# Patient Record
Sex: FEMALE | Race: WHITE | Employment: UNEMPLOYED | URBAN - METROPOLITAN AREA
[De-identification: names, ages, dates, MRNs, and addresses within clinical notes are randomized per-mention and may not be internally consistent; named-entity substitution may affect disease eponyms.]

---

## 2017-01-30 ENCOUNTER — OFFICE VISIT (OUTPATIENT)
Dept: CARDIOLOGY CLINIC | Age: 47
End: 2017-01-30

## 2017-01-30 VITALS
HEIGHT: 59 IN | WEIGHT: 227.6 LBS | BODY MASS INDEX: 45.88 KG/M2 | HEART RATE: 96 BPM | OXYGEN SATURATION: 98 % | SYSTOLIC BLOOD PRESSURE: 130 MMHG | DIASTOLIC BLOOD PRESSURE: 80 MMHG

## 2017-01-30 DIAGNOSIS — R07.2 PRECORDIAL PAIN: Primary | ICD-10-CM

## 2017-01-30 PROCEDURE — G8484 FLU IMMUNIZE NO ADMIN: HCPCS | Performed by: INTERNAL MEDICINE

## 2017-01-30 PROCEDURE — G8427 DOCREV CUR MEDS BY ELIG CLIN: HCPCS | Performed by: INTERNAL MEDICINE

## 2017-01-30 PROCEDURE — 1036F TOBACCO NON-USER: CPT | Performed by: INTERNAL MEDICINE

## 2017-01-30 PROCEDURE — 93000 ELECTROCARDIOGRAM COMPLETE: CPT | Performed by: INTERNAL MEDICINE

## 2017-01-30 PROCEDURE — G8419 CALC BMI OUT NRM PARAM NOF/U: HCPCS | Performed by: INTERNAL MEDICINE

## 2017-01-30 PROCEDURE — 99204 OFFICE O/P NEW MOD 45 MIN: CPT | Performed by: INTERNAL MEDICINE

## 2017-01-30 RX ORDER — OMEPRAZOLE 40 MG/1
40 CAPSULE, DELAYED RELEASE ORAL DAILY
Qty: 90 CAPSULE | Refills: 6 | Status: SHIPPED | OUTPATIENT
Start: 2017-01-30 | End: 2018-05-02

## 2018-05-02 ENCOUNTER — PAT TELEPHONE (OUTPATIENT)
Dept: PREADMISSION TESTING | Age: 48
End: 2018-05-02

## 2018-05-02 VITALS — HEIGHT: 59 IN | WEIGHT: 230 LBS | BODY MASS INDEX: 46.37 KG/M2

## 2018-05-02 RX ORDER — LOSARTAN POTASSIUM 25 MG/1
25 TABLET ORAL DAILY
COMMUNITY

## 2018-05-07 ENCOUNTER — HOSPITAL ENCOUNTER (OUTPATIENT)
Dept: ENDOSCOPY | Age: 48
Discharge: HOME | End: 2018-05-07
Attending: INTERNAL MEDICINE | Admitting: INTERNAL MEDICINE

## 2018-05-07 VITALS
HEART RATE: 69 BPM | SYSTOLIC BLOOD PRESSURE: 109 MMHG | TEMPERATURE: 97 F | RESPIRATION RATE: 16 BRPM | DIASTOLIC BLOOD PRESSURE: 69 MMHG | OXYGEN SATURATION: 98 % | HEIGHT: 59 IN | BODY MASS INDEX: 46.13 KG/M2 | WEIGHT: 228.84 LBS

## 2018-05-07 LAB — PREGNANCY, URINE: NEGATIVE

## 2018-05-07 RX ORDER — SODIUM CHLORIDE 0.9 % (FLUSH) 0.9 %
10 SYRINGE (ML) INJECTION PRN
Status: DISCONTINUED | OUTPATIENT
Start: 2018-05-07 | End: 2018-05-08 | Stop reason: HOSPADM

## 2018-05-07 RX ORDER — SODIUM CHLORIDE 9 MG/ML
INJECTION, SOLUTION INTRAVENOUS CONTINUOUS
Status: DISCONTINUED | OUTPATIENT
Start: 2018-05-07 | End: 2018-05-08 | Stop reason: HOSPADM

## 2018-05-07 RX ORDER — SODIUM CHLORIDE 0.9 % (FLUSH) 0.9 %
10 SYRINGE (ML) INJECTION EVERY 12 HOURS SCHEDULED
Status: DISCONTINUED | OUTPATIENT
Start: 2018-05-07 | End: 2018-05-08 | Stop reason: HOSPADM

## 2018-05-07 RX ORDER — ONDANSETRON 2 MG/ML
4 INJECTION INTRAMUSCULAR; INTRAVENOUS
Status: ACTIVE | OUTPATIENT
Start: 2018-05-07 | End: 2018-05-07

## 2018-05-07 ASSESSMENT — PAIN SCALES - GENERAL
PAINLEVEL_OUTOF10: 0

## 2018-05-07 ASSESSMENT — LIFESTYLE VARIABLES: SMOKING_STATUS: 0

## 2018-05-07 ASSESSMENT — PAIN - FUNCTIONAL ASSESSMENT: PAIN_FUNCTIONAL_ASSESSMENT: 0-10

## 2018-05-07 ASSESSMENT — ENCOUNTER SYMPTOMS: SHORTNESS OF BREATH: 0

## 2023-06-20 ENCOUNTER — HOSPITAL ENCOUNTER (OUTPATIENT)
Dept: MRI IMAGING | Age: 53
Discharge: HOME | End: 2023-06-20
Attending: NEUROLOGICAL SURGERY
Payer: COMMERCIAL

## 2023-06-20 DIAGNOSIS — D32.9 MENINGIOMA (HCC): ICD-10-CM

## 2023-06-20 LAB
PERFORMED ON: NORMAL
POC CREATININE: 0.6 MG/DL (ref 0.6–1.1)
POC SAMPLE TYPE: NORMAL

## 2023-06-20 PROCEDURE — A9577 INJ MULTIHANCE: HCPCS | Performed by: NEUROLOGICAL SURGERY

## 2023-06-20 PROCEDURE — 70553 MRI BRAIN STEM W/O & W/DYE: CPT

## 2023-06-20 PROCEDURE — 82565 ASSAY OF CREATININE: CPT

## 2023-06-20 PROCEDURE — 6360000004 HC RX CONTRAST MEDICATION: Performed by: NEUROLOGICAL SURGERY

## 2023-06-20 RX ADMIN — GADOBENATE DIMEGLUMINE 20 ML: 529 INJECTION, SOLUTION INTRAVENOUS at 10:45

## 2023-08-17 ENCOUNTER — CLINICAL DOCUMENTATION (OUTPATIENT)
Age: 53
End: 2023-08-17

## 2023-08-17 ENCOUNTER — CLINICAL DOCUMENTATION (OUTPATIENT)
Dept: SPIRITUAL SERVICES | Age: 53
End: 2023-08-17

## 2023-08-17 NOTE — ACP (ADVANCE CARE PLANNING)
Advance Care Planning   Advance Care Planning Note  Ambulatory Spiritual Care Services    Date:  8/17/2023    Received request from Other: Patient's friend Raynald Felty . Consultation conversation participants:   Patient who understands ACP conversation  Patient's friend(s)     Goals of ACP Conversation:  Discuss advance care planning documents  Facilitate a discussion related to patient's goals of care as they align with the patient's values and beliefs. Health Care Decision Makers:      Primary Decision Maker: Charles Brizuela - [phone]    Secondary Decision Maker: Jimmie Burris - [phone]    Supplemental (Other) Decision Maker: Luciana Bryant - [phone]   Summary:  Completed New Documents  Updated Healthcare Decision Maker    Advance Care Planning Documents (Patient Wishes)  Currently on file:   Healthcare Power of /Advance Directive Appointment of Health Care Agent  Living Will/Advance Directive    Assessment:    assisted patient with completion of [address] and Living Will documents. Patient stated that she trusts her proxies to make decisions which are consistent with her wishes for care. She included the following special instruction in the documents: \"If I am unable to live independently, I want to be placed in a skilled nursing facility, including hospice, if appropriate. I do not want to be cared for in anyone else's home. \"      Interventions:  Provided education on documents for clarity and greater understanding  Assisted in the completion of documents according to patient's wishes at this time  Encouraged ongoing ACP conversation with future decision makers and loved ones    Care Preferences Communicated:   No    Outcomes:  ACP Discussion: Completed  New advance directive completed.   Returned original document(s) to patient, as well as copies for distribution to appointed agents  Copy of advance directive given to staff to

## 2023-08-17 NOTE — ACP (ADVANCE CARE PLANNING)
Advance Care Planning   Ambulatory ACP Specialist Patient Outreach    Date:  8/17/2023    ACP Specialist:  CRISTINA Norton    Outreach call to patient in follow-up to ACP Specialist referral from:Won Ley MD    [x] PCP  [x] Provider   [] Ambulatory Care Management [] Other     For:                  [] Advance Directive Assistance              [] Complete Portable DNR order              [] Complete POST/POLST/MOST              [] Code Status Discussion             [] Discuss Goals of Care             [] Early ACP Decision-Making              [x] Other (Specify): Ms. Harris's friend, Nghia Mtz, contacted the Spiritual Care office at [facility] on behalf of pt to request assistance with ACP documents. Patient will be starting radiation at [facility] on 8/21/23 and would like to complete documents prior to then, per Ms. Bro    Date Referral Received:8/16/2023    Next Step:   [] ACP scheduled conversation  [x] Outreach again in one week               [] Email / Mail ACP Info Sheets  [] Email / Mail Advance Directive   [] Closing referral.  Routing closure to referring provider/staff and to ACP Specialist . [] Closure letter mailed to patient with invitation to contact ACP Specialist if / when ready. [] Other (Specify here):         [x] At this time, Healthcare Decision Maker Is:  Advance Care Planning   Healthcare Decision Maker:    Primary Decision Maker: Milly Powell - [place] - [phone]    Primary Decision Maker: Jocelynmechelle Dubin - [phone]    Click here to complete Healthcare Decision Makers including selection of the Healthcare Decision Maker Relationship (ie \"Primary\"). [] Primary agent named in scanned advance directive. [x] Legal Next of Kin. [] Unable to determine legal decision maker at this time.          Outreaches:       [x] 1st -  Date:  8/17/2023               Intervention:  [] Spoke with Patient   [] Left Voice mail [] Email / Mail    [] [employer]  [x]

## 2023-08-21 ENCOUNTER — HOSPITAL ENCOUNTER (OUTPATIENT)
Dept: CT IMAGING | Age: 53
Discharge: HOME | End: 2023-08-21
Payer: COMMERCIAL

## 2023-08-21 ENCOUNTER — HOSPITAL ENCOUNTER (OUTPATIENT)
Dept: RADIATION ONCOLOGY | Age: 53
Discharge: HOME | End: 2023-08-21
Payer: COMMERCIAL

## 2023-08-21 ENCOUNTER — HOSPITAL ENCOUNTER (OUTPATIENT)
Dept: MRI IMAGING | Age: 53
Discharge: HOME | End: 2023-08-21
Payer: COMMERCIAL

## 2023-08-21 VITALS
SYSTOLIC BLOOD PRESSURE: 137 MMHG | DIASTOLIC BLOOD PRESSURE: 77 MMHG | OXYGEN SATURATION: 96 % | RESPIRATION RATE: 16 BRPM | HEART RATE: 73 BPM

## 2023-08-21 DIAGNOSIS — D32.9 MENINGIOMA (HCC): ICD-10-CM

## 2023-08-21 PROCEDURE — 6360000004 HC RX CONTRAST MEDICATION: Performed by: NEUROLOGICAL SURGERY

## 2023-08-21 PROCEDURE — A9579 GAD-BASE MR CONTRAST NOS,1ML: HCPCS | Performed by: NEUROLOGICAL SURGERY

## 2023-08-21 PROCEDURE — 70553 MRI BRAIN STEM W/O & W/DYE: CPT

## 2023-08-21 PROCEDURE — 77295 3-D RADIOTHERAPY PLAN: CPT

## 2023-08-21 PROCEDURE — 77373 STRTCTC BDY RAD THER TX DLVR: CPT

## 2023-08-21 PROCEDURE — 70450 CT HEAD/BRAIN W/O DYE: CPT

## 2023-08-21 PROCEDURE — 77334 RADIATION TREATMENT AID(S): CPT

## 2023-08-21 PROCEDURE — 77300 RADIATION THERAPY DOSE PLAN: CPT

## 2023-08-21 RX ORDER — DEXAMETHASONE 2 MG/1
2 TABLET ORAL 2 TIMES DAILY WITH MEALS
COMMUNITY

## 2023-08-21 RX ORDER — PANTOPRAZOLE SODIUM 40 MG/1
40 FOR SUSPENSION ORAL
COMMUNITY

## 2023-08-21 RX ORDER — BUSPIRONE HYDROCHLORIDE 5 MG/1
5 TABLET ORAL 2 TIMES DAILY
COMMUNITY

## 2023-08-21 RX ADMIN — GADOTERIDOL 20 ML: 279.3 INJECTION, SOLUTION INTRAVENOUS at 07:53

## 2023-08-21 ASSESSMENT — PAIN DESCRIPTION - DESCRIPTORS: DESCRIPTORS: PINS AND NEEDLES

## 2023-08-21 ASSESSMENT — PAIN SCALES - GENERAL: PAINLEVEL_OUTOF10: 2

## 2023-08-21 ASSESSMENT — PAIN DESCRIPTION - LOCATION: LOCATION: HEAD

## 2023-08-21 ASSESSMENT — PAIN DESCRIPTION - ONSET: ONSET: ON-GOING

## 2023-08-21 ASSESSMENT — PAIN DESCRIPTION - ORIENTATION: ORIENTATION: POSTERIOR

## 2023-08-21 ASSESSMENT — PAIN - FUNCTIONAL ASSESSMENT: PAIN_FUNCTIONAL_ASSESSMENT: ACTIVITIES ARE NOT PREVENTED

## 2023-08-21 ASSESSMENT — PAIN DESCRIPTION - FREQUENCY: FREQUENCY: CONTINUOUS

## 2023-08-21 ASSESSMENT — PAIN DESCRIPTION - PAIN TYPE: TYPE: CHRONIC PAIN

## 2023-08-21 NOTE — OP NOTE
[address] [place] [address]  PATIENT NAME:   Ирина Choudhury   MR #:  [id number]  YOB: 1970   ACCOUNT #:  [de-identified]  SURGEON:  Robert Sawyer MD   ADMIT DATE:  8/21/2023  SERVICE:  Neurosurgery  DICTATED BY: Robert Sawyer MD   SURGERY DATE:  8/21/2023           OPERATIVE REPORT       PREOPERATIVE DIAGNOSIS:     1. Recurrent torcular meningioma     POSTOPERATIVE DIAGNOSIS:     1. Same    PROCEDURE(S) PERFORMED:      1. Five-fraction frameless Gamma Knife radiosurgery for recurrent torcular meningioma     TREATMENT DATES:     8/21/2023 through 8/25/2023 (five weekday treatments)      NEUROSURGEON: Robert Sawyer MD                                    RADIATION ONCOLOGIST: Zak Vanegas MD     ANESTHESIA: None    COMPLICATIONS: None    INDICATIONS: This is a 82-year-old woman who underwent craniotomy and resection of a massive left occipital meningioma (WHO 1, MIB 10%) on 11/15/13. The patient was followed expectantly and recent MRI scans showed gradual tumor recurrence. We discussed various treatment options but ultimately recommended five-fraction, frameless Gamma Knife radiosurgery. The patient was aware of the potential benefits in terms of tumor control and the possible risks including (but not limited to): brain swelling, seizures, bleeding, new neurological symptoms, and/or radiation injury of the brain. PERFORMANCE STATUS: 70%    SYSTEMIC DISEASE: Not applicable    DETAILS OF PROCEDURE: The patient underwent a radiosurgery fusion MRI scan prior to the procedure. A thermoplastic mask was formed and the patient underwent a reference cone-beam CT scan in the Gamma Knife Icon. These images were sent over the network to the [address] [place] [address] and fused with the MRI scan. An optimal treatment plan was generated and approved by the neurosurgeon, radiation oncologist, and medical physicist. All critical structure constraints were fulfilled.      On the days of treatment, the patient was

## 2023-08-21 NOTE — PROGRESS NOTES
Gamma Knife Radiation Delivery Time Out    1. Patient states name and birthdate correctly? Yes    2. Procedure listed on consent Stereotactic Radiosurgery, Gamma Knife for torcular meningioma    3. Is this the correct procedure? Yes    4. Is this a trigeminal neuralgia case? No    -If yes, what side are we treating? N/A    -If yes, is the side marked for laterality? N/A    5. Has the patient received steroids prior to radiation delivery? yes    6. Does the patient require Keppra prior to treatment delivery? yes    7. Does the patient require Ativan prior to treatment delivery? N/A    8. Are all present in agreement? Yes    9.  Those present for time out:  Surinder Cabrera RN, Dr. Belén Martinez

## 2023-08-21 NOTE — ONCOLOGY
Fractionated SRS treatment note on the gamma knife machine. Dontae Teresa  08/21/23       Patient presents for her 1st of 5 treatments on the gamma knife machine for her recurrrent grade 1 meningioma of the torcula. She was taken to the gamma knife machine, and her mask was fitted to the machine. Patient confirmation and identification was confirmed using 2 means and a timeout procedure was also done prior to the procedure. Cone beam CT scan was performed. These images were reviewed in detailed and fused upon her stereotactic reference images. Isodose lines were also reviewed. These were then accepted by myself and our physicist.     Fraction Number: 1  Dose delivered: 5 Gy  Total dose delivered: 5 Gy  Planned total dose:  25 Gy     The patient acutely tolerated the procedure well. Mask was removed. She will follow-up when the next appointment has been scheduled. I was present throughout the procedure.     Lynsey Cruz MD

## 2023-08-21 NOTE — PROGRESS NOTES
Patient arrived to [address] with friends Pati Mcmanus, boyfriend' sister) for fraction 1/. Medications updated.      KPS: 70  ECO  mFI-5: 4

## 2023-08-22 ENCOUNTER — HOSPITAL ENCOUNTER (OUTPATIENT)
Dept: RADIATION ONCOLOGY | Age: 53
Discharge: HOME | End: 2023-08-22
Payer: COMMERCIAL

## 2023-08-22 PROCEDURE — 77373 STRTCTC BDY RAD THER TX DLVR: CPT

## 2023-08-22 NOTE — PROGRESS NOTES
Patient completed today's treatment without issues. Discharge instructions offered to patient including signs and symptoms to monitor for. Patient declined paper copy but verbalizes understanding. Additional Gamma Knife education packets provided to patient and friend per request. Several questions answered at this time by this RN. Patient aware of next treatment date and time.      Valeria Salamanca RN

## 2023-08-22 NOTE — PROGRESS NOTES
Patient arrived to [address] [place] [address] Drive with friend for fraction 2/5. Medications updated.

## 2023-08-22 NOTE — PROGRESS NOTES
Patient completed today's treatment without issues. Discharge instructions offered to patient including signs and symptoms to monitor for. Patient declined paper copy but verbalizes understanding. Patient aware of next treatment date and time.

## 2023-08-23 ENCOUNTER — HOSPITAL ENCOUNTER (OUTPATIENT)
Dept: RADIATION ONCOLOGY | Age: 53
Discharge: HOME | End: 2023-08-23
Payer: COMMERCIAL

## 2023-08-23 PROCEDURE — 77373 STRTCTC BDY RAD THER TX DLVR: CPT

## 2023-08-24 ENCOUNTER — HOSPITAL ENCOUNTER (OUTPATIENT)
Dept: RADIATION ONCOLOGY | Age: 53
Discharge: HOME | End: 2023-08-24
Payer: COMMERCIAL

## 2023-08-24 PROCEDURE — 77373 STRTCTC BDY RAD THER TX DLVR: CPT

## 2023-08-24 NOTE — PROGRESS NOTES
Patient arrived to [address] [place] [address] Drive with friend for fraction 4/5. Medications updated.

## 2023-08-25 ENCOUNTER — HOSPITAL ENCOUNTER (OUTPATIENT)
Dept: RADIATION ONCOLOGY | Age: 53
Discharge: HOME | End: 2023-08-25
Payer: COMMERCIAL

## 2023-08-25 PROCEDURE — 77373 STRTCTC BDY RAD THER TX DLVR: CPT

## 2023-08-25 NOTE — PROGRESS NOTES
5205  Patient arrived for her 5/5 Frameless treatments. Patient completed treatment with no complications. RN reviewed discharge medications and symptoms to be aware following treatment. Patient verbalized understanding. RN encouraged the patient to call with any questions or concerns. Reminded the patient that a [phone] Benitez RN will call on Monday and 2 weeks from now.     Sheryl Law RN

## 2023-08-25 NOTE — DISCHARGE INSTRUCTIONS
GAMMA KNIFE POST-PROCEDURE INSTRUCTIONS    You may return to work or school after 24 hours if you feel well enough. You should resume all of your regular activities unless the physician has instructed you otherwise. You may resume your usual diet right away. Follow up with your physician as directed. Please contact Dr. Carlos Brumfield at [phone] or Gamma knife at [phone] with any new vision changes, balance problems, numbness/tingling, or severe headache. If you have an emergent concern and need to be seen by a physician immediately, please go to [facility] emergency room. Steroid taper:  Continue taking Dexamethasone 2mg. Take 2mg this evening and then take 2mg- 2 Times a day for 5 days and then take 2mg daily for 5 days and STOP! Continue taking your Keppra as prescribed. Continue taking Protonix 40mg Daily    If you have any questions during regular business hours, call the [facility] nurse at [phone]. For questions during off-business hours and on weekends, contact Dr. Perez [place] office directly at [phone]. For Elma Dunaway [place] or Naye call [phone].

## 2024-02-22 ENCOUNTER — HOSPITAL ENCOUNTER (OUTPATIENT)
Dept: MRI IMAGING | Age: 54
Discharge: HOME | End: 2024-02-22
Attending: NEUROLOGICAL SURGERY
Payer: MEDICARE

## 2024-02-22 DIAGNOSIS — D32.9 MENINGIOMA (HCC): ICD-10-CM

## 2024-02-22 PROCEDURE — 70553 MRI BRAIN STEM W/O & W/DYE: CPT

## 2024-02-22 PROCEDURE — 6360000004 HC RX CONTRAST MEDICATION: Performed by: NEUROLOGICAL SURGERY

## 2024-02-22 PROCEDURE — A9576 INJ PROHANCE MULTIPACK: HCPCS | Performed by: NEUROLOGICAL SURGERY

## 2024-02-22 RX ADMIN — GADOTERIDOL 20 ML: 279.3 INJECTION, SOLUTION INTRAVENOUS at 17:47

## 2024-12-30 ENCOUNTER — TRANSCRIBE ORDERS (OUTPATIENT)
Dept: ADMINISTRATIVE | Age: 54
End: 2024-12-30

## 2024-12-30 DIAGNOSIS — D32.9 MENINGIOMA (HCC): Primary | ICD-10-CM

## 2025-02-05 ENCOUNTER — HOSPITAL ENCOUNTER (OUTPATIENT)
Dept: MRI IMAGING | Age: 55
Discharge: HOME | End: 2025-02-05
Attending: NEUROLOGICAL SURGERY
Payer: MEDICARE

## 2025-02-05 DIAGNOSIS — D32.9 MENINGIOMA (HCC): ICD-10-CM

## 2025-02-05 PROCEDURE — A9576 INJ PROHANCE MULTIPACK: HCPCS | Performed by: NEUROLOGICAL SURGERY

## 2025-02-05 PROCEDURE — 70553 MRI BRAIN STEM W/O & W/DYE: CPT

## 2025-02-05 PROCEDURE — 6360000004 HC RX CONTRAST MEDICATION: Performed by: NEUROLOGICAL SURGERY

## 2025-02-05 RX ADMIN — GADOTERIDOL 20 ML: 279.3 INJECTION, SOLUTION INTRAVENOUS at 16:44

## 2025-03-14 ENCOUNTER — HOSPITAL ENCOUNTER (INPATIENT)
Age: 55
LOS: 7 days | Discharge: NURSING, REHAB OR LONG-TERM CARE | End: 2025-03-21
Attending: STUDENT IN AN ORGANIZED HEALTH CARE EDUCATION/TRAINING PROGRAM | Admitting: INTERNAL MEDICINE
Payer: MEDICARE

## 2025-03-14 ENCOUNTER — APPOINTMENT (OUTPATIENT)
Dept: CT IMAGING | Age: 55
End: 2025-03-14
Payer: MEDICARE

## 2025-03-14 DIAGNOSIS — N17.9 AKI (ACUTE KIDNEY INJURY): ICD-10-CM

## 2025-03-14 DIAGNOSIS — R10.84 GENERALIZED ABDOMINAL PAIN: ICD-10-CM

## 2025-03-14 DIAGNOSIS — E87.6 HYPOKALEMIA: ICD-10-CM

## 2025-03-14 DIAGNOSIS — R11.2 NAUSEA AND VOMITING, UNSPECIFIED VOMITING TYPE: ICD-10-CM

## 2025-03-14 DIAGNOSIS — R11.14 BILIOUS VOMITING WITH NAUSEA: Primary | ICD-10-CM

## 2025-03-14 LAB
ALBUMIN SERPL-MCNC: 4.8 G/DL (ref 3.4–5)
ALBUMIN/GLOB SERPL: 1.5 {RATIO} (ref 1.1–2.2)
ALP SERPL-CCNC: 103 U/L (ref 40–129)
ALT SERPL-CCNC: 19 U/L (ref 10–40)
ANION GAP SERPL CALCULATED.3IONS-SCNC: 16 MMOL/L (ref 3–16)
ANION GAP SERPL CALCULATED.3IONS-SCNC: 20 MMOL/L (ref 3–16)
AST SERPL-CCNC: 21 U/L (ref 15–37)
B-HCG SERPL EIA 3RD IS-ACNC: <5 MIU/ML
BACTERIA URNS QL MICRO: ABNORMAL /HPF
BILIRUB SERPL-MCNC: 1.3 MG/DL (ref 0–1)
BILIRUB UR QL STRIP.AUTO: NEGATIVE
BUN SERPL-MCNC: 52 MG/DL (ref 7–20)
BUN SERPL-MCNC: 54 MG/DL (ref 7–20)
CALCIUM SERPL-MCNC: 11.1 MG/DL (ref 8.3–10.6)
CALCIUM SERPL-MCNC: 9.9 MG/DL (ref 8.3–10.6)
CASTS #/AREA URNS LPF: ABNORMAL /LPF
CHARACTER UR: ABNORMAL
CHLORIDE SERPL-SCNC: 90 MMOL/L (ref 99–110)
CHLORIDE SERPL-SCNC: 96 MMOL/L (ref 99–110)
CLARITY UR: ABNORMAL
CO2 SERPL-SCNC: 26 MMOL/L (ref 21–32)
CO2 SERPL-SCNC: 27 MMOL/L (ref 21–32)
COLOR UR: ABNORMAL
CREAT SERPL-MCNC: 3.1 MG/DL (ref 0.6–1.1)
CREAT SERPL-MCNC: 3.8 MG/DL (ref 0.6–1.1)
EPI CELLS #/AREA URNS HPF: ABNORMAL /HPF (ref 0–5)
GFR SERPLBLD CREATININE-BSD FMLA CKD-EPI: 14 ML/MIN/{1.73_M2}
GFR SERPLBLD CREATININE-BSD FMLA CKD-EPI: 17 ML/MIN/{1.73_M2}
GLUCOSE SERPL-MCNC: 121 MG/DL (ref 70–99)
GLUCOSE SERPL-MCNC: 198 MG/DL (ref 70–99)
GLUCOSE UR STRIP.AUTO-MCNC: NEGATIVE MG/DL
HCG SERPL QL: POSITIVE
HGB UR QL STRIP.AUTO: NEGATIVE
HYALINE CASTS #/AREA URNS LPF: ABNORMAL /LPF (ref 0–2)
KETONES UR STRIP.AUTO-MCNC: ABNORMAL MG/DL
LEUKOCYTE ESTERASE UR QL STRIP.AUTO: ABNORMAL
LIPASE SERPL-CCNC: 245 U/L (ref 13–60)
MAGNESIUM SERPL-MCNC: 1.87 MG/DL (ref 1.8–2.4)
NITRITE UR QL STRIP.AUTO: NEGATIVE
PH UR STRIP.AUTO: 5 [PH] (ref 5–8)
POTASSIUM SERPL-SCNC: 2.6 MMOL/L (ref 3.5–5.1)
POTASSIUM SERPL-SCNC: 2.9 MMOL/L (ref 3.5–5.1)
PROT SERPL-MCNC: 8.1 G/DL (ref 6.4–8.2)
PROT UR STRIP.AUTO-MCNC: 100 MG/DL
RBC #/AREA URNS HPF: ABNORMAL /HPF (ref 0–4)
RENAL EPI CELLS #/AREA UR COMP ASSIST: ABNORMAL /HPF (ref 0–1)
SODIUM SERPL-SCNC: 137 MMOL/L (ref 136–145)
SODIUM SERPL-SCNC: 138 MMOL/L (ref 136–145)
SP GR UR STRIP.AUTO: 1.02 (ref 1–1.03)
UA COMPLETE W REFLEX CULTURE PNL UR: YES
UA DIPSTICK W REFLEX MICRO PNL UR: YES
URN SPEC COLLECT METH UR: ABNORMAL
UROBILINOGEN UR STRIP-ACNC: 1 E.U./DL
WBC #/AREA URNS HPF: ABNORMAL /HPF (ref 0–5)

## 2025-03-14 PROCEDURE — 84703 CHORIONIC GONADOTROPIN ASSAY: CPT

## 2025-03-14 PROCEDURE — 87077 CULTURE AEROBIC IDENTIFY: CPT

## 2025-03-14 PROCEDURE — 6360000002 HC RX W HCPCS: Performed by: NURSE PRACTITIONER

## 2025-03-14 PROCEDURE — 96375 TX/PRO/DX INJ NEW DRUG ADDON: CPT

## 2025-03-14 PROCEDURE — 6360000002 HC RX W HCPCS: Performed by: STUDENT IN AN ORGANIZED HEALTH CARE EDUCATION/TRAINING PROGRAM

## 2025-03-14 PROCEDURE — 94640 AIRWAY INHALATION TREATMENT: CPT

## 2025-03-14 PROCEDURE — 87186 SC STD MICRODIL/AGAR DIL: CPT

## 2025-03-14 PROCEDURE — 96361 HYDRATE IV INFUSION ADD-ON: CPT

## 2025-03-14 PROCEDURE — 84702 CHORIONIC GONADOTROPIN TEST: CPT

## 2025-03-14 PROCEDURE — 2580000003 HC RX 258: Performed by: STUDENT IN AN ORGANIZED HEALTH CARE EDUCATION/TRAINING PROGRAM

## 2025-03-14 PROCEDURE — 83735 ASSAY OF MAGNESIUM: CPT

## 2025-03-14 PROCEDURE — 85025 COMPLETE CBC W/AUTO DIFF WBC: CPT

## 2025-03-14 PROCEDURE — 87086 URINE CULTURE/COLONY COUNT: CPT

## 2025-03-14 PROCEDURE — 2000000000 HC ICU R&B

## 2025-03-14 PROCEDURE — 96365 THER/PROPH/DIAG IV INF INIT: CPT

## 2025-03-14 PROCEDURE — 6370000000 HC RX 637 (ALT 250 FOR IP): Performed by: INTERNAL MEDICINE

## 2025-03-14 PROCEDURE — 6360000002 HC RX W HCPCS: Performed by: INTERNAL MEDICINE

## 2025-03-14 PROCEDURE — 36415 COLL VENOUS BLD VENIPUNCTURE: CPT

## 2025-03-14 PROCEDURE — 2500000003 HC RX 250 WO HCPCS: Performed by: STUDENT IN AN ORGANIZED HEALTH CARE EDUCATION/TRAINING PROGRAM

## 2025-03-14 PROCEDURE — 80053 COMPREHEN METABOLIC PANEL: CPT

## 2025-03-14 PROCEDURE — 83690 ASSAY OF LIPASE: CPT

## 2025-03-14 PROCEDURE — 99285 EMERGENCY DEPT VISIT HI MDM: CPT

## 2025-03-14 PROCEDURE — 2580000003 HC RX 258: Performed by: INTERNAL MEDICINE

## 2025-03-14 PROCEDURE — 94760 N-INVAS EAR/PLS OXIMETRY 1: CPT

## 2025-03-14 PROCEDURE — 74176 CT ABD & PELVIS W/O CONTRAST: CPT

## 2025-03-14 PROCEDURE — 81001 URINALYSIS AUTO W/SCOPE: CPT

## 2025-03-14 RX ORDER — ISOSORBIDE MONONITRATE 30 MG/1
30 TABLET, EXTENDED RELEASE ORAL DAILY
Status: DISCONTINUED | OUTPATIENT
Start: 2025-03-14 | End: 2025-03-21 | Stop reason: HOSPADM

## 2025-03-14 RX ORDER — SODIUM CHLORIDE 0.9 % (FLUSH) 0.9 %
5-40 SYRINGE (ML) INJECTION EVERY 12 HOURS SCHEDULED
Status: DISCONTINUED | OUTPATIENT
Start: 2025-03-14 | End: 2025-03-21 | Stop reason: HOSPADM

## 2025-03-14 RX ORDER — LEVETIRACETAM 1000 MG/1
1000 TABLET ORAL 2 TIMES DAILY
COMMUNITY
Start: 2025-03-07

## 2025-03-14 RX ORDER — ATORVASTATIN CALCIUM 80 MG/1
80 TABLET, FILM COATED ORAL NIGHTLY
Status: DISCONTINUED | OUTPATIENT
Start: 2025-03-14 | End: 2025-03-21 | Stop reason: HOSPADM

## 2025-03-14 RX ORDER — SODIUM CHLORIDE 0.9 % (FLUSH) 0.9 %
5-40 SYRINGE (ML) INJECTION PRN
Status: DISCONTINUED | OUTPATIENT
Start: 2025-03-14 | End: 2025-03-21 | Stop reason: HOSPADM

## 2025-03-14 RX ORDER — POTASSIUM CHLORIDE 7.45 MG/ML
10 INJECTION INTRAVENOUS
Status: DISPENSED | OUTPATIENT
Start: 2025-03-14 | End: 2025-03-14

## 2025-03-14 RX ORDER — DULAGLUTIDE 4.5 MG/.5ML
4.5 INJECTION, SOLUTION SUBCUTANEOUS WEEKLY
COMMUNITY
Start: 2025-03-05

## 2025-03-14 RX ORDER — GABAPENTIN 300 MG/1
300 CAPSULE ORAL NIGHTLY
COMMUNITY
Start: 2025-02-13 | End: 2025-05-14

## 2025-03-14 RX ORDER — ACETAMINOPHEN 650 MG/1
650 SUPPOSITORY RECTAL EVERY 6 HOURS PRN
Status: DISCONTINUED | OUTPATIENT
Start: 2025-03-14 | End: 2025-03-21 | Stop reason: HOSPADM

## 2025-03-14 RX ORDER — METOPROLOL SUCCINATE 50 MG/1
50 TABLET, EXTENDED RELEASE ORAL DAILY
Status: DISCONTINUED | OUTPATIENT
Start: 2025-03-14 | End: 2025-03-21 | Stop reason: HOSPADM

## 2025-03-14 RX ORDER — FAMOTIDINE 20 MG/1
20 TABLET, FILM COATED ORAL DAILY
Status: DISCONTINUED | OUTPATIENT
Start: 2025-03-15 | End: 2025-03-21 | Stop reason: HOSPADM

## 2025-03-14 RX ORDER — GABAPENTIN 100 MG/1
100 CAPSULE ORAL NIGHTLY
Status: DISCONTINUED | OUTPATIENT
Start: 2025-03-14 | End: 2025-03-21 | Stop reason: HOSPADM

## 2025-03-14 RX ORDER — DOCUSATE SODIUM 100 MG/1
100 CAPSULE, LIQUID FILLED ORAL 2 TIMES DAILY
Status: DISCONTINUED | OUTPATIENT
Start: 2025-03-14 | End: 2025-03-21 | Stop reason: HOSPADM

## 2025-03-14 RX ORDER — FAMOTIDINE 20 MG/1
20 TABLET, FILM COATED ORAL DAILY
COMMUNITY
Start: 2025-03-07

## 2025-03-14 RX ORDER — CALCIUM CARBONATE 500 MG/1
1 TABLET, CHEWABLE ORAL EVERY 8 HOURS PRN
Status: DISCONTINUED | OUTPATIENT
Start: 2025-03-14 | End: 2025-03-21 | Stop reason: HOSPADM

## 2025-03-14 RX ORDER — ACETAMINOPHEN 325 MG/1
650 TABLET ORAL EVERY 6 HOURS PRN
Status: DISCONTINUED | OUTPATIENT
Start: 2025-03-14 | End: 2025-03-21 | Stop reason: HOSPADM

## 2025-03-14 RX ORDER — BUDESONIDE AND FORMOTEROL FUMARATE DIHYDRATE 80; 4.5 UG/1; UG/1
2 AEROSOL RESPIRATORY (INHALATION)
Status: DISCONTINUED | OUTPATIENT
Start: 2025-03-14 | End: 2025-03-21 | Stop reason: HOSPADM

## 2025-03-14 RX ORDER — SODIUM CHLORIDE 9 MG/ML
INJECTION, SOLUTION INTRAVENOUS CONTINUOUS
Status: DISCONTINUED | OUTPATIENT
Start: 2025-03-14 | End: 2025-03-14

## 2025-03-14 RX ORDER — 0.9 % SODIUM CHLORIDE 0.9 %
1000 INTRAVENOUS SOLUTION INTRAVENOUS ONCE
Status: COMPLETED | OUTPATIENT
Start: 2025-03-14 | End: 2025-03-14

## 2025-03-14 RX ORDER — LEVETIRACETAM 500 MG/1
1000 TABLET ORAL 2 TIMES DAILY
Status: DISCONTINUED | OUTPATIENT
Start: 2025-03-14 | End: 2025-03-15

## 2025-03-14 RX ORDER — METOPROLOL SUCCINATE 50 MG/1
50 TABLET, EXTENDED RELEASE ORAL DAILY
COMMUNITY
Start: 2024-10-18

## 2025-03-14 RX ORDER — LOSARTAN POTASSIUM 100 MG/1
100 TABLET ORAL DAILY
COMMUNITY
Start: 2025-03-07

## 2025-03-14 RX ORDER — ONDANSETRON 4 MG/1
4 TABLET, ORALLY DISINTEGRATING ORAL EVERY 8 HOURS PRN
Status: DISCONTINUED | OUTPATIENT
Start: 2025-03-14 | End: 2025-03-21 | Stop reason: HOSPADM

## 2025-03-14 RX ORDER — BUDESONIDE AND FORMOTEROL FUMARATE DIHYDRATE 80; 4.5 UG/1; UG/1
2 AEROSOL RESPIRATORY (INHALATION) 2 TIMES DAILY
COMMUNITY
Start: 2024-10-28

## 2025-03-14 RX ORDER — ATORVASTATIN CALCIUM 80 MG/1
80 TABLET, FILM COATED ORAL NIGHTLY
COMMUNITY
Start: 2024-10-18

## 2025-03-14 RX ORDER — METRONIDAZOLE 500 MG/100ML
500 INJECTION, SOLUTION INTRAVENOUS EVERY 8 HOURS
Status: DISCONTINUED | OUTPATIENT
Start: 2025-03-14 | End: 2025-03-21 | Stop reason: HOSPADM

## 2025-03-14 RX ORDER — FLUOXETINE HYDROCHLORIDE 40 MG/1
40 CAPSULE ORAL DAILY
COMMUNITY
Start: 2025-03-07

## 2025-03-14 RX ORDER — ONDANSETRON 2 MG/ML
4 INJECTION INTRAMUSCULAR; INTRAVENOUS ONCE
Status: COMPLETED | OUTPATIENT
Start: 2025-03-14 | End: 2025-03-14

## 2025-03-14 RX ORDER — ALBUTEROL SULFATE 90 UG/1
2 INHALANT RESPIRATORY (INHALATION) EVERY 6 HOURS PRN
Status: DISCONTINUED | OUTPATIENT
Start: 2025-03-14 | End: 2025-03-21 | Stop reason: HOSPADM

## 2025-03-14 RX ORDER — ONDANSETRON 2 MG/ML
4 INJECTION INTRAMUSCULAR; INTRAVENOUS EVERY 6 HOURS PRN
Status: DISCONTINUED | OUTPATIENT
Start: 2025-03-14 | End: 2025-03-21 | Stop reason: HOSPADM

## 2025-03-14 RX ORDER — AMLODIPINE BESYLATE 10 MG/1
10 TABLET ORAL DAILY
COMMUNITY
Start: 2025-03-07

## 2025-03-14 RX ORDER — SODIUM CHLORIDE 9 MG/ML
INJECTION, SOLUTION INTRAVENOUS PRN
Status: DISCONTINUED | OUTPATIENT
Start: 2025-03-14 | End: 2025-03-21 | Stop reason: HOSPADM

## 2025-03-14 RX ORDER — HYDROCHLOROTHIAZIDE 12.5 MG/1
12.5 CAPSULE ORAL DAILY
COMMUNITY
Start: 2024-04-16 | End: 2025-03-14

## 2025-03-14 RX ORDER — BUSPIRONE HYDROCHLORIDE 5 MG/1
5 TABLET ORAL 2 TIMES DAILY
Status: DISCONTINUED | OUTPATIENT
Start: 2025-03-14 | End: 2025-03-21 | Stop reason: HOSPADM

## 2025-03-14 RX ORDER — SODIUM CHLORIDE, SODIUM LACTATE, POTASSIUM CHLORIDE, CALCIUM CHLORIDE 600; 310; 30; 20 MG/100ML; MG/100ML; MG/100ML; MG/100ML
INJECTION, SOLUTION INTRAVENOUS CONTINUOUS
Status: DISCONTINUED | OUTPATIENT
Start: 2025-03-14 | End: 2025-03-21 | Stop reason: HOSPADM

## 2025-03-14 RX ORDER — ENOXAPARIN SODIUM 100 MG/ML
30 INJECTION SUBCUTANEOUS DAILY
Status: DISCONTINUED | OUTPATIENT
Start: 2025-03-15 | End: 2025-03-16

## 2025-03-14 RX ORDER — ALBUTEROL SULFATE 90 UG/1
2 INHALANT RESPIRATORY (INHALATION) EVERY 6 HOURS PRN
COMMUNITY
Start: 2024-10-18

## 2025-03-14 RX ORDER — POTASSIUM CHLORIDE 7.45 MG/ML
10 INJECTION INTRAVENOUS
Status: COMPLETED | OUTPATIENT
Start: 2025-03-14 | End: 2025-03-15

## 2025-03-14 RX ORDER — CALCIUM CARBONATE 500 MG/1
1 TABLET, CHEWABLE ORAL EVERY 8 HOURS PRN
COMMUNITY

## 2025-03-14 RX ORDER — DICYCLOMINE HYDROCHLORIDE 10 MG/1
10 CAPSULE ORAL ONCE
Status: DISCONTINUED | OUTPATIENT
Start: 2025-03-14 | End: 2025-03-21 | Stop reason: HOSPADM

## 2025-03-14 RX ORDER — ISOSORBIDE MONONITRATE 30 MG/1
30 TABLET, EXTENDED RELEASE ORAL DAILY
COMMUNITY
Start: 2024-10-28

## 2025-03-14 RX ORDER — PROMETHAZINE HYDROCHLORIDE 25 MG/1
25 TABLET ORAL EVERY 6 HOURS PRN
Status: ON HOLD | COMMUNITY
Start: 2025-03-12 | End: 2025-03-21 | Stop reason: HOSPADM

## 2025-03-14 RX ORDER — AMLODIPINE BESYLATE 10 MG/1
10 TABLET ORAL DAILY
Status: CANCELLED | OUTPATIENT
Start: 2025-03-14

## 2025-03-14 RX ORDER — VITAMIN B COMPLEX
2000 TABLET ORAL DAILY
Status: DISCONTINUED | OUTPATIENT
Start: 2025-03-15 | End: 2025-03-21 | Stop reason: HOSPADM

## 2025-03-14 RX ORDER — SODIUM CHLORIDE 9 MG/ML
INJECTION, SOLUTION INTRAVENOUS CONTINUOUS
Status: DISCONTINUED | OUTPATIENT
Start: 2025-03-14 | End: 2025-03-15

## 2025-03-14 RX ORDER — POLYETHYLENE GLYCOL 3350 17 G/17G
17 POWDER, FOR SOLUTION ORAL DAILY PRN
Status: DISCONTINUED | OUTPATIENT
Start: 2025-03-14 | End: 2025-03-21 | Stop reason: HOSPADM

## 2025-03-14 RX ORDER — PSEUDOEPHEDRINE HCL 30 MG
100 TABLET ORAL 2 TIMES DAILY
COMMUNITY
Start: 2024-10-18

## 2025-03-14 RX ORDER — ONDANSETRON 4 MG/1
4 TABLET, FILM COATED ORAL EVERY 6 HOURS PRN
COMMUNITY

## 2025-03-14 RX ORDER — OXYBUTYNIN CHLORIDE 10 MG/1
10 TABLET, EXTENDED RELEASE ORAL DAILY
Status: DISCONTINUED | OUTPATIENT
Start: 2025-03-14 | End: 2025-03-21 | Stop reason: HOSPADM

## 2025-03-14 RX ORDER — AMLODIPINE BESYLATE 10 MG/1
10 TABLET ORAL DAILY
Status: DISCONTINUED | OUTPATIENT
Start: 2025-03-14 | End: 2025-03-14

## 2025-03-14 RX ADMIN — GABAPENTIN 100 MG: 100 CAPSULE ORAL at 20:00

## 2025-03-14 RX ADMIN — POTASSIUM CHLORIDE 10 MEQ: 7.46 INJECTION, SOLUTION INTRAVENOUS at 21:49

## 2025-03-14 RX ADMIN — POTASSIUM CHLORIDE 10 MEQ: 7.46 INJECTION, SOLUTION INTRAVENOUS at 16:45

## 2025-03-14 RX ADMIN — FLUOXETINE HYDROCHLORIDE 40 MG: 20 CAPSULE ORAL at 20:00

## 2025-03-14 RX ADMIN — METRONIDAZOLE 500 MG: 500 INJECTION, SOLUTION INTRAVENOUS at 20:00

## 2025-03-14 RX ADMIN — POTASSIUM CHLORIDE 10 MEQ: 7.46 INJECTION, SOLUTION INTRAVENOUS at 22:51

## 2025-03-14 RX ADMIN — METOPROLOL SUCCINATE 50 MG: 50 TABLET, EXTENDED RELEASE ORAL at 20:00

## 2025-03-14 RX ADMIN — POTASSIUM CHLORIDE 10 MEQ: 7.46 INJECTION, SOLUTION INTRAVENOUS at 17:48

## 2025-03-14 RX ADMIN — ATORVASTATIN CALCIUM 80 MG: 80 TABLET, FILM COATED ORAL at 20:00

## 2025-03-14 RX ADMIN — POTASSIUM CHLORIDE 10 MEQ: 7.46 INJECTION, SOLUTION INTRAVENOUS at 14:19

## 2025-03-14 RX ADMIN — ONDANSETRON 4 MG: 2 INJECTION, SOLUTION INTRAMUSCULAR; INTRAVENOUS at 12:43

## 2025-03-14 RX ADMIN — POTASSIUM CHLORIDE 10 MEQ: 7.46 INJECTION, SOLUTION INTRAVENOUS at 23:51

## 2025-03-14 RX ADMIN — OXYBUTYNIN CHLORIDE 10 MG: 10 TABLET, EXTENDED RELEASE ORAL at 20:00

## 2025-03-14 RX ADMIN — BUSPIRONE HYDROCHLORIDE 5 MG: 5 TABLET ORAL at 20:00

## 2025-03-14 RX ADMIN — SODIUM CHLORIDE: 0.9 INJECTION, SOLUTION INTRAVENOUS at 16:45

## 2025-03-14 RX ADMIN — FAMOTIDINE 20 MG: 10 INJECTION, SOLUTION INTRAVENOUS at 12:43

## 2025-03-14 RX ADMIN — ISOSORBIDE MONONITRATE 30 MG: 60 TABLET, EXTENDED RELEASE ORAL at 20:00

## 2025-03-14 RX ADMIN — LEVETIRACETAM 1000 MG: 500 TABLET, FILM COATED ORAL at 20:00

## 2025-03-14 RX ADMIN — BUDESONIDE AND FORMOTEROL FUMARATE DIHYDRATE 2 PUFF: 80; 4.5 AEROSOL RESPIRATORY (INHALATION) at 20:41

## 2025-03-14 RX ADMIN — POTASSIUM CHLORIDE 10 MEQ: 7.46 INJECTION, SOLUTION INTRAVENOUS at 20:44

## 2025-03-14 RX ADMIN — SODIUM CHLORIDE: 0.9 INJECTION, SOLUTION INTRAVENOUS at 19:47

## 2025-03-14 RX ADMIN — SODIUM CHLORIDE 1000 ML: 0.9 INJECTION, SOLUTION INTRAVENOUS at 12:42

## 2025-03-14 RX ADMIN — WATER 1000 MG: 1 INJECTION INTRAMUSCULAR; INTRAVENOUS; SUBCUTANEOUS at 16:38

## 2025-03-14 ASSESSMENT — PAIN SCALES - GENERAL
PAINLEVEL_OUTOF10: 5
PAINLEVEL_OUTOF10: 0
PAINLEVEL_OUTOF10: 0

## 2025-03-14 ASSESSMENT — PAIN DESCRIPTION - LOCATION: LOCATION: ABDOMEN

## 2025-03-14 ASSESSMENT — PAIN DESCRIPTION - PAIN TYPE: TYPE: ACUTE PAIN

## 2025-03-14 ASSESSMENT — PAIN - FUNCTIONAL ASSESSMENT
PAIN_FUNCTIONAL_ASSESSMENT: ACTIVITIES ARE NOT PREVENTED
PAIN_FUNCTIONAL_ASSESSMENT: 0-10

## 2025-03-14 ASSESSMENT — PAIN DESCRIPTION - DESCRIPTORS: DESCRIPTORS: PATIENT UNABLE TO DESCRIBE

## 2025-03-14 NOTE — ED PROVIDER NOTES
[facility] EMERGENCY DEPARTMENT    CHIEF COMPLAINT  Vomiting and Abdominal Pain (C/o emesis since 3/3 with generalized abd pain starting a couple of days ago. Emesis in emesis bag noted to be yellowish/green. Reports baseline diarrhea, states that this is d/t a cholecystectomy in . Pt is legally blind and recently moved into an assisted living facility.)       HISTORY OF PRESENT ILLNESS  Lydia Harris is a 54 y.o. female presenting to the ED for nausea and vomiting.  Patient also reports abdominal pain.  Onset of symptoms started 1 month ago.  Patient initially thought she had COVID or the flu.  Patient currently stays in the facility with a nurse practitioner as primary provider.  Patient endorses that she had COVID and influenza testing however was negative.  Patient states she has had abdominal pain with nausea and vomit before in the past which required her gallbladder to be removed.  Patient also states a sensation of fullness when she eats.  Patient states symptoms have been intermittent coming and going over the last month.  Symptoms worsen last night which is why she came into the emergency department for further evaluation.  Patient denies any blood in stool or vomit.  Patient denies vaginal bleeding or vaginal discharge.  Patient does states she has had loose stools consistent with diarrhea.  Patient states she has had 3  sections.  Patient denies any prior GI history besides cholecystectomy.  Patient denies any urinary symptoms such as dysuria or increased urinary frequency.    - History obtained from: Patient  - Limitations to history: None    I have reviewed the following from the nursing documentation:    Past Medical History:   Diagnosis Date    Brain tumor (HCC)     one surgery november and one december     Depression     GERD (gastroesophageal reflux disease)     Gestational diabetes     Heavy periods     Hypertension     Legally blind     Migraines     Seizures (HCC)   Fluent speech. Symmetric facies. Answers questions appropriately. Normal gait.   Psych: Appropriate affect, appropriate mood, cooperative.     LABS  I have reviewed all labs for this visit.   No results found for this visit on 03/14/25.      RADIOLOGY  I have reviewed all radiographic studies for this visit.   CT ABDOMEN PELVIS W IV CONTRAST Additional Contrast? None    (Results Pending)          My ECG interpretation   N/A    ED COURSE/MDM    54 y.o. female presenting to the ED for nausea, vomiting, abdominal pain.  Patient is hemodynamically stable on arrival to emergency department.  Differential diagnosis includes but not limited to pregnancy, SBO, pancreatitis, intra-abdominal abscess, perforated viscus, gastritis, biliary colic, diverticulitis, colitis, constipation, acute cholecystitis, acute appendicitis, ovarian torsion, ectopic pregnancy, nephrolithiasis, gastritis, peptic ulcer disease, mesenteric ischemia, referred pain due to pneumonia,UTI,  hyperemesis secondary to cannabinoid use, cyclic vomiting disorder,  functional abdominal pain, AAA, hernia, and abdominal wall rectus muscle pain. Patient was given Bentyl, Pepcid, Zofran, IV fluids, Toradol for abdominal pain and nausea vomiting. Labs ordered which includes hCG, lipase, CMP, CBC, UA.  Labs ordered for further investigation and ruling out electrolyte abnormality, anemia, leukocytosis concerning for infection, pancreatitis, and indications of liver dysfunction.  CT abdomen pelvis ordered for patient due to tenderness on examination.    Update: Patient shown to have a leukocytosis of 17.5, serum hCG pregnancy quantitative test less than 5.  Magnesium within normal limits, lipase elevated to 245, CMP shows a hypokalemia of 2.6, creatinine elevated at 3.8, total bilirubin elevated 1.3.  Urinalysis shows moderate leukocyte esterase with 20-50 epithelial cells and plus for bacteria.  Given leukocytosis in the setting of possible UTI patient was started

## 2025-03-14 NOTE — PROGRESS NOTES
Medication Reconciliation    List of medications patient is currently taking is complete.     Source of information: 1. [facility]                                      2. EPIC records        Notes regarding home medications:   1. Patient reports she didn't receive any medication this morning PTA      Donta Freedman, [facility]   3/14/2025  4:01 PM

## 2025-03-14 NOTE — PROGRESS NOTES
Patient here from ED via stretcher. Placed on tele. VS taken. Pt placed on BSC for toileting. Patient oriented to unit and room. Admit assessment to follow and history obtained. Orders reviewed with patient and POC discussed. Pt belongings include: purse, cell phone, , $120 cash (pt requests to keep at bedside) and credit cards. Call light in reach. Bed in lowest position, bed alarm on. Denies other needs. Will continue to monitor.     Electronically signed by Mya Jolley RN on 3/14/2025 at 6:57 PM

## 2025-03-14 NOTE — ED NOTES
ED TO INPATIENT SBAR HANDOFF    Patient Name: Lydia Harris   Preferred Name: Lydia  : 1970  54 y.o.   Family/Caregiver Present: no   Code Status Order: No Order  PO Status: NPO:Yes  Telemetry Order: 72 hours  C-SSRS: Risk of Suicide: No Risk  Sitter no   Restraints:   no  Sepsis Risk Score  16    Situation  Chief Complaint   Patient presents with    Vomiting    Abdominal Pain     C/o emesis since 3/3 with generalized abd pain starting a couple of days ago. Emesis in emesis bag noted to be yellowish/green. Reports baseline diarrhea, states that this is d/t a cholecystectomy in . Pt is legally blind and recently moved into an assisted living facility.     Brief Description of Patient's Condition: Pt is lying supine, A&Ox4. Calm and cooperative talking to family on the phone. Continent to bowel and bladder. Ambulates with rollator and sight/mobility cane at baseline d/t being legally blind. Pt is not comfortable ambulating at this time d/t weakness. Respirations even, easy, unlabored on room air.  Mental Status: oriented, alert, coherent, logical, thought processes intact, and able to concentrate and follow conversation  Arrived from:Home  Imaging:   CT ABDOMEN PELVIS WO CONTRAST Additional Contrast? None   Final Result   1. No acute intra-abdominal or pelvic process.   2. Hepatic steatosis.           Abnormal labs:   Abnormal Labs Reviewed   CBC WITH AUTO DIFFERENTIAL - Abnormal; Notable for the following components:       Result Value    WBC 17.5 (*)     RBC 5.54 (*)     Neutrophils Absolute 11.7 (*)     Monocytes Absolute 1.9 (*)     All other components within normal limits   COMPREHENSIVE METABOLIC PANEL W/ REFLEX TO MG FOR LOW K - Abnormal; Notable for the following components:    Potassium reflex Magnesium 2.6 (*)     Chloride 90 (*)     Anion Gap 20 (*)     Glucose 198 (*)     BUN 54 (*)     Creatinine 3.8 (*)     Est, Glom Filt Rate 14 (*)     Calcium 11.1 (*)     Total Bilirubin 1.3 (*)   [unfilled]  Cultures: Cultures:Urine  NIH Score: NIH     Active LDA's:   Peripheral IV 03/14/25 Right Antecubital (Active)     Active Central Lines:                          Active Wounds:  0  Active Lee's:  0  Active Feeding Tubes:    0  Administered Medications:   Medications   dicyclomine (BENTYL) capsule 10 mg (10 mg Oral Not Given 3/14/25 1424)   potassium chloride 10 mEq/100 mL IVPB (Peripheral Line) (10 mEq IntraVENous New Bag 3/14/25 1748)   0.9 % sodium chloride infusion ( IntraVENous New Bag 3/14/25 1645)   sodium chloride 0.9 % bolus 1,000 mL (0 mLs IntraVENous Stopped 3/14/25 1632)   ondansetron (ZOFRAN) injection 4 mg (4 mg IntraVENous Given 3/14/25 1243)   famotidine (PEPCID) 20 MG/2ML 20 mg in sodium chloride (PF) 0.9 % 10 mL injection (20 mg IntraVENous Given 3/14/25 1243)   cefTRIAXone (ROCEPHIN) 1,000 mg in sterile water 10 mL IV syringe (1,000 mg IntraVENous Given 3/14/25 1638)     Last documented pain medication administration: none  Pertinent or High Risk Medications/Drips: no   If Yes, please provide details:   Blood Product Administration: no  If Yes, please provide details:   Process Protocols/Bundles: N/A    Recommendation  Incomplete STAT orders: one(1) potassium chloride 10mEq/100mL  Overdue Medications:   Patient Belongings:    Additional Comments:   If any further questions, please call Sending RN at [phone]        Electronically signed by: Electronically signed by Sacha Carrasco RN on 3/14/2025 at 6:11 PM

## 2025-03-14 NOTE — H&P
Hospital Medicine History & Physical      PCP: No primary care provider on file.    Date of Admission: 3/14/2025    Date of Service: Pt seen/examined on 2025 and Admitted to Inpatient with expected LOS greater than two midnights due to medical therapy.     Chief Complaint: Nausea vomiting abdominal pain, patient states that she is having the symptoms for a month or so but significantly worse in the last week at this time she is residing in an assisted living facility legally blind denies fever or chills stated that she is having chronic diarrhea denies any chest pain or shortness of breath on presentation to the hospital found to have elevated creatinine lipase being admitted for further management and treatment CT scan without contrast completed in ED without specific finding.  Discussed with ED MD agrees plan to admit      History Of Present Illness:      54 y.o. female who presented to [facility] with nausea vomiting abdominal pain    Past Medical History:          Diagnosis Date    Brain tumor (HCC)     one surgery november and one december     Depression     GERD (gastroesophageal reflux disease)     Gestational diabetes     Heavy periods     Hypertension     Legally blind     Migraines     Seizures (HCC)     Urinary incontinence     Visual impairment        Past Surgical History:          Procedure Laterality Date    BRAIN SURGERY      2013 and december     SECTION      x`s 3    CHOLECYSTECTOMY      COLONOSCOPY      -Dr. Odonnell    TONSILLECTOMY      TUBAL LIGATION      TYMPANOPLASTY Bilateral     artificial ear drums    UPPER GASTROINTESTINAL ENDOSCOPY      -Dr. Odonnell       Medications Prior to Admission:      Prior to Admission medications    Medication Sig Start Date End Date Taking? Authorizing Provider   atorvastatin (LIPITOR) 80 MG tablet Take 1 tablet by mouth nightly 10/18/24  Yes Provider, Kalen, MD   budesonide-formoterol (SYMBICORT) 80-4.5  Provider, MD Kalen   promethazine (PHENERGAN) 25 MG tablet Take 1 tablet by mouth every 6 hours as needed for Nausea 3/12/25 3/19/25  Kalen Aleman MD   ondansetron (ZOFRAN) 4 MG tablet Take 1 tablet by mouth every 6 hours as needed for Nausea or Vomiting    Kalen Aleman MD   acetaminophen (TYLENOL) 325 MG tablet Take 2 tablets by mouth every 4 hours as needed for Pain  Patient taking differently: Take 2 tablets by mouth every 6 hours as needed for Pain 7/13/16   Ethan Rothman MD       Allergies:  Penicillins    Social History:      The patient currently lives at assisted living    TOBACCO:   reports that she quit smoking about 28 years ago. She has never used smokeless tobacco.  ETOH:   reports no history of alcohol use.  E-cigarette/Vaping       Questions Responses    E-cigarette/Vaping Use Never User    Start Date     Passive Exposure     Quit Date     Counseling Given     Comments               Family History:      Reviewed and negative in regards to presenting illness/complaint.        Problem Relation Age of Onset    Cancer Mother     Heart Disease Father     Diabetes Father        REVIEW OF SYSTEMS COMPLETED:   Pertinent positives as noted in the HPI. All other systems reviewed and negative.    PHYSICAL EXAM PERFORMED:    /79   Pulse 74   Temp 99.2 °F (37.3 °C) (Oral)   Resp 14   Ht 1.499 m (4' 11\")   Wt 93.5 kg (206 lb 2.1 oz)   LMP 12/15/2017   SpO2 98%   BMI 41.63 kg/m²     General appearance:  No apparent distress  HEENT:  Normal cephalic  Respiratory:  Normal respiratory effort. Clear to auscultation, bilaterally without Rales/Wheezes/Rhonchi.  Cardiovascular:  Regular rate and rhythm with normal S1/S2 without murmurs, rubs or gallops.  Abdomen: Soft, non-tender  Musculoskeletal:  No clubbing  Neurologic: Generalized weakness  Psychiatric:  Alert and oriented  Capillary Refill: Brisk,3 seconds, normal  Peripheral Pulses: +2 palpable, equal bilaterally

## 2025-03-14 NOTE — ED TRIAGE NOTES
C/o emesis since 3/3 with generalized abd pain starting a couple of days ago. Emesis in emesis bag noted to be yellowish/green. Reports baseline diarrhea, states that this is d/t a cholecystectomy in 2001. Pt is legally blind and recently moved into an assisted living facility.

## 2025-03-15 LAB
ALBUMIN SERPL-MCNC: 3.7 G/DL (ref 3.4–5)
ALBUMIN/GLOB SERPL: 1.6 {RATIO} (ref 1.1–2.2)
ALP SERPL-CCNC: 74 U/L (ref 40–129)
ALT SERPL-CCNC: 12 U/L (ref 10–40)
ANION GAP SERPL CALCULATED.3IONS-SCNC: 11 MMOL/L (ref 3–16)
ANION GAP SERPL CALCULATED.3IONS-SCNC: 14 MMOL/L (ref 3–16)
AST SERPL-CCNC: 14 U/L (ref 15–37)
BASOPHILS # BLD: 0.1 K/UL (ref 0–0.2)
BASOPHILS NFR BLD: 0.7 %
BILIRUB SERPL-MCNC: 0.8 MG/DL (ref 0–1)
BUN SERPL-MCNC: 46 MG/DL (ref 7–20)
BUN SERPL-MCNC: 49 MG/DL (ref 7–20)
CALCIUM SERPL-MCNC: 9.2 MG/DL (ref 8.3–10.6)
CALCIUM SERPL-MCNC: 9.7 MG/DL (ref 8.3–10.6)
CHLORIDE SERPL-SCNC: 98 MMOL/L (ref 99–110)
CHLORIDE SERPL-SCNC: 98 MMOL/L (ref 99–110)
CO2 SERPL-SCNC: 27 MMOL/L (ref 21–32)
CO2 SERPL-SCNC: 28 MMOL/L (ref 21–32)
CREAT SERPL-MCNC: 2.2 MG/DL (ref 0.6–1.1)
CREAT SERPL-MCNC: 2.6 MG/DL (ref 0.6–1.1)
DEPRECATED RDW RBC AUTO: 13.9 % (ref 12.4–15.4)
EOSINOPHIL # BLD: 0.2 K/UL (ref 0–0.6)
EOSINOPHIL NFR BLD: 1.5 %
GFR SERPLBLD CREATININE-BSD FMLA CKD-EPI: 21 ML/MIN/{1.73_M2}
GFR SERPLBLD CREATININE-BSD FMLA CKD-EPI: 26 ML/MIN/{1.73_M2}
GLUCOSE SERPL-MCNC: 121 MG/DL (ref 70–99)
GLUCOSE SERPL-MCNC: 124 MG/DL (ref 70–99)
HCT VFR BLD AUTO: 36 % (ref 36–48)
HGB BLD-MCNC: 12.2 G/DL (ref 12–16)
LYMPHOCYTES # BLD: 4.3 K/UL (ref 1–5.1)
LYMPHOCYTES NFR BLD: 34.8 %
MAGNESIUM SERPL-MCNC: 1.68 MG/DL (ref 1.8–2.4)
MCH RBC QN AUTO: 28.1 PG (ref 26–34)
MCHC RBC AUTO-ENTMCNC: 33.8 G/DL (ref 31–36)
MCV RBC AUTO: 83.2 FL (ref 80–100)
MONOCYTES # BLD: 1.1 K/UL (ref 0–1.3)
MONOCYTES NFR BLD: 9.2 %
NEUTROPHILS # BLD: 6.7 K/UL (ref 1.7–7.7)
NEUTROPHILS NFR BLD: 53.8 %
PLATELET # BLD AUTO: 264 K/UL (ref 135–450)
PMV BLD AUTO: 9.7 FL (ref 5–10.5)
POTASSIUM SERPL-SCNC: 3.4 MMOL/L (ref 3.5–5.1)
POTASSIUM SERPL-SCNC: 3.7 MMOL/L (ref 3.5–5.1)
PROT SERPL-MCNC: 6 G/DL (ref 6.4–8.2)
RBC # BLD AUTO: 4.33 M/UL (ref 4–5.2)
SODIUM SERPL-SCNC: 136 MMOL/L (ref 136–145)
SODIUM SERPL-SCNC: 140 MMOL/L (ref 136–145)
TSH SERPL DL<=0.005 MIU/L-ACNC: 0.75 UIU/ML (ref 0.27–4.2)
WBC # BLD AUTO: 12.5 K/UL (ref 4–11)

## 2025-03-15 PROCEDURE — 2580000003 HC RX 258: Performed by: INTERNAL MEDICINE

## 2025-03-15 PROCEDURE — 97166 OT EVAL MOD COMPLEX 45 MIN: CPT

## 2025-03-15 PROCEDURE — 6360000002 HC RX W HCPCS: Performed by: INTERNAL MEDICINE

## 2025-03-15 PROCEDURE — 6370000000 HC RX 637 (ALT 250 FOR IP): Performed by: INTERNAL MEDICINE

## 2025-03-15 PROCEDURE — 94760 N-INVAS EAR/PLS OXIMETRY 1: CPT

## 2025-03-15 PROCEDURE — 83735 ASSAY OF MAGNESIUM: CPT

## 2025-03-15 PROCEDURE — 80053 COMPREHEN METABOLIC PANEL: CPT

## 2025-03-15 PROCEDURE — 2500000003 HC RX 250 WO HCPCS: Performed by: INTERNAL MEDICINE

## 2025-03-15 PROCEDURE — 85025 COMPLETE CBC W/AUTO DIFF WBC: CPT

## 2025-03-15 PROCEDURE — 84443 ASSAY THYROID STIM HORMONE: CPT

## 2025-03-15 PROCEDURE — 36415 COLL VENOUS BLD VENIPUNCTURE: CPT

## 2025-03-15 PROCEDURE — 97530 THERAPEUTIC ACTIVITIES: CPT

## 2025-03-15 PROCEDURE — 1200000000 HC SEMI PRIVATE

## 2025-03-15 PROCEDURE — 94640 AIRWAY INHALATION TREATMENT: CPT

## 2025-03-15 PROCEDURE — 6360000002 HC RX W HCPCS: Performed by: NURSE PRACTITIONER

## 2025-03-15 RX ORDER — SCOPOLAMINE 1 MG/3D
1 PATCH, EXTENDED RELEASE TRANSDERMAL
Status: DISCONTINUED | OUTPATIENT
Start: 2025-03-15 | End: 2025-03-21 | Stop reason: HOSPADM

## 2025-03-15 RX ORDER — LEVETIRACETAM 500 MG/5ML
1000 INJECTION, SOLUTION, CONCENTRATE INTRAVENOUS EVERY 12 HOURS
Status: DISCONTINUED | OUTPATIENT
Start: 2025-03-15 | End: 2025-03-20

## 2025-03-15 RX ORDER — PROCHLORPERAZINE EDISYLATE 5 MG/ML
10 INJECTION INTRAMUSCULAR; INTRAVENOUS EVERY 6 HOURS PRN
Status: DISCONTINUED | OUTPATIENT
Start: 2025-03-15 | End: 2025-03-21 | Stop reason: HOSPADM

## 2025-03-15 RX ORDER — POTASSIUM CHLORIDE 1500 MG/1
40 TABLET, EXTENDED RELEASE ORAL ONCE
Status: DISCONTINUED | OUTPATIENT
Start: 2025-03-15 | End: 2025-03-21 | Stop reason: HOSPADM

## 2025-03-15 RX ORDER — MAGNESIUM SULFATE 1 G/100ML
1000 INJECTION INTRAVENOUS ONCE
Status: COMPLETED | OUTPATIENT
Start: 2025-03-15 | End: 2025-03-15

## 2025-03-15 RX ADMIN — GABAPENTIN 100 MG: 100 CAPSULE ORAL at 20:56

## 2025-03-15 RX ADMIN — POTASSIUM CHLORIDE 10 MEQ: 7.46 INJECTION, SOLUTION INTRAVENOUS at 02:16

## 2025-03-15 RX ADMIN — METRONIDAZOLE 500 MG: 500 INJECTION, SOLUTION INTRAVENOUS at 03:06

## 2025-03-15 RX ADMIN — SODIUM CHLORIDE, POTASSIUM CHLORIDE, SODIUM LACTATE AND CALCIUM CHLORIDE: 600; 310; 30; 20 INJECTION, SOLUTION INTRAVENOUS at 10:30

## 2025-03-15 RX ADMIN — ENOXAPARIN SODIUM 30 MG: 100 INJECTION SUBCUTANEOUS at 10:29

## 2025-03-15 RX ADMIN — FAMOTIDINE 20 MG: 20 TABLET, FILM COATED ORAL at 06:05

## 2025-03-15 RX ADMIN — BUDESONIDE AND FORMOTEROL FUMARATE DIHYDRATE 2 PUFF: 80; 4.5 AEROSOL RESPIRATORY (INHALATION) at 21:42

## 2025-03-15 RX ADMIN — MAGNESIUM SULFATE HEPTAHYDRATE 1000 MG: 1 INJECTION, SOLUTION INTRAVENOUS at 14:24

## 2025-03-15 RX ADMIN — METRONIDAZOLE 500 MG: 500 INJECTION, SOLUTION INTRAVENOUS at 10:00

## 2025-03-15 RX ADMIN — POTASSIUM CHLORIDE 10 MEQ: 7.46 INJECTION, SOLUTION INTRAVENOUS at 01:07

## 2025-03-15 RX ADMIN — BUDESONIDE AND FORMOTEROL FUMARATE DIHYDRATE 2 PUFF: 80; 4.5 AEROSOL RESPIRATORY (INHALATION) at 09:29

## 2025-03-15 RX ADMIN — PROCHLORPERAZINE EDISYLATE 10 MG: 5 INJECTION INTRAMUSCULAR; INTRAVENOUS at 13:24

## 2025-03-15 RX ADMIN — METRONIDAZOLE 500 MG: 500 INJECTION, SOLUTION INTRAVENOUS at 19:40

## 2025-03-15 RX ADMIN — SODIUM CHLORIDE, PRESERVATIVE FREE 10 ML: 5 INJECTION INTRAVENOUS at 21:00

## 2025-03-15 RX ADMIN — WATER 1000 MG: 1 INJECTION INTRAMUSCULAR; INTRAVENOUS; SUBCUTANEOUS at 14:22

## 2025-03-15 RX ADMIN — SODIUM CHLORIDE, POTASSIUM CHLORIDE, SODIUM LACTATE AND CALCIUM CHLORIDE: 600; 310; 30; 20 INJECTION, SOLUTION INTRAVENOUS at 03:07

## 2025-03-15 RX ADMIN — SODIUM CHLORIDE, PRESERVATIVE FREE 40 MG: 5 INJECTION INTRAVENOUS at 20:58

## 2025-03-15 RX ADMIN — BUSPIRONE HYDROCHLORIDE 5 MG: 5 TABLET ORAL at 20:56

## 2025-03-15 RX ADMIN — SODIUM CHLORIDE, PRESERVATIVE FREE 10 ML: 5 INJECTION INTRAVENOUS at 21:09

## 2025-03-15 RX ADMIN — SODIUM CHLORIDE, PRESERVATIVE FREE 40 MG: 5 INJECTION INTRAVENOUS at 14:22

## 2025-03-15 RX ADMIN — Medication 2000 UNITS: at 10:03

## 2025-03-15 RX ADMIN — LEVETIRACETAM 1000 MG: 100 INJECTION INTRAVENOUS at 20:59

## 2025-03-15 ASSESSMENT — PAIN SCALES - GENERAL
PAINLEVEL_OUTOF10: 0
PAINLEVEL_OUTOF10: 0

## 2025-03-15 NOTE — PROGRESS NOTES
Report called to ZHANNA Ventura to resume care. All questions answered. Patient transported via wheelchair to [facility] in stable condition.    Electronically signed by Peggy Queen RN on 3/15/2025 at 3:36 AM

## 2025-03-15 NOTE — PROGRESS NOTES
V2.0    [facility] [place] Progress Note      Name:  Lydia Harris /Age/Sex: 1970  (54 y.o. female)   MRN & CSN:  [id number] & [id number] Encounter Date/Time: 3/15/2025 9:53 AM EDT   Location:  [id number] PCP: No primary care provider on file.     Attending:Brooks Dixon MD       Hospital Day: 2    Assessment and Recommendations   Lydia Harris is a 54 y.o. female who presents with Intractable nausea and vomiting      Plan:     Intractable nausea and vomiting, CT scan nonspecific lipase elevated CT scan did not show pancreatitis but pancreatitis still in the differential at this time continue to be on IV fluid GI consulted  Severe hypokalemia admitted to hospital, aggressive replacing recheck BMP potassium on presentation 2.6, improved now 3.4 continue to replace per protocol  Hypercalcemia calcium 11.1 on admission down to 9.2 within normal limits  Leukocytosis white count 17.5 on admission likely reactive, improved down to 12.5, empiric antibiotics with ceftriaxone adding Flagyl  Acute kidney injury hypovolemic due to dehydration creatinine improved down to 2.2 from 3.8 on admission repeat CMP in a.m.  Possible UTI started on IV antibiotics started on admission  Seizure disorder continue home medication  Legally blind supportive care      Diet Diet NPO   DVT Prophylaxis [] Lovenox, []  Heparin, [] SCDs, [] Ambulation,  [] Eliquis, [] Xarelto  [] Coumadin   Code Status Full Code             Personally reviewed Lab Studies and Imaging       Rhythm strip independently interpreted by myself heart rate 53 QT 05 no ST elevation        Drugs that require monitoring for toxicity include Keppra and the method of monitoring was LFTs as patient also with JANIE    Medical Decision Making:  The following items were considered in medical decision making:  Discussion of patient care with other providers  Reviewed clinical lab tests  Reviewed radiology tests  Reviewed other diagnostic

## 2025-03-15 NOTE — PROGRESS NOTES
4 Eyes Skin Assessment     NAME:  Lydia Harris  YOB: 1970  MEDICAL RECORD NUMBER:  [id number]    The patient is being assessed for  Transfer to New Unit    I agree that at least one RN has performed a thorough Head to Toe Skin Assessment on the patient. ALL assessment sites listed below have been assessed.      Areas assessed by both nurses:    Head, Face, Ears, Shoulders, Back, Chest, Arms, Elbows, Hands, Sacrum. Buttock, Coccyx, Ischium, and Legs. Feet and Heels        Does the Patient have a Wound? No noted wound(s)       Ricardo Prevention initiated by RN: No  Wound Care Orders initiated by RN: No    Pressure Injury (Stage 3,4, Unstageable, DTI, NWPT, and Complex wounds) if present, place Wound referral order by RN under : No    New Ostomies, if present place, Ostomy referral order under : No     Nurse 1 eSignature: Electronically signed by Audrey Mahajan RN on 3/15/25 at 4:47 AM EDT    **SHARE this note so that the co-signing nurse can place an eSignature**    Nurse 2 eSignature: {Esignature:[id number]}

## 2025-03-15 NOTE — CONSULTS
GASTROENTEROLOGY INPATIENT CONSULTATION      IDENTIFYING DATA/REASON FOR CONSULTATION   PATIENT:  Lydia Harris  MRN:  [id number]  ADMIT DATE: 3/14/2025  TIME OF EVALUATION: 3/15/2025 8:23 AM  HOSPITAL STAY:   LOS: 1 day     REASON FOR CONSULTATION: Nausea and vomiting    HISTORY OF PRESENT ILLNESS   Lydia Harris is a 54 y.o. female with past medical history of hypertension, DM-II, visual impairment, GERD, depression, chronic diarrhea, intracranial mass who presented to [facility] 3/14/2025 with nausea and vomiting.  Patient has had episodic symptoms over the past 12 days.  She reports bilious vomiting 4-5 times per day which began 3/3/2025.  Prior to that, she notes some early satiety as well as anorexia but denies any severe abdominal pain.  She notes some mild dull abdominal discomfort throughout her abdomen which she attributes to vomiting.  This pain does not radiate to her back.  She denies any new medications prior to onset of symptoms.  She does report struggling with acid reflux.  She denies any hematemesis or coffee-ground emesis.  She notes some loose stools since onset of symptoms but denies any knowledge of melena or hematochezia although her visual impairment precludes her from examining her stool.    PAST MEDICAL, SURGICAL, FAMILY, and SOCIAL HISTORY     Past Medical History:   Diagnosis Date    Brain tumor (HCC)     one surgery november and one december     Depression     GERD (gastroesophageal reflux disease)     Gestational diabetes     Heavy periods     Hypertension     Legally blind     Migraines     Seizures (HCC)     Urinary incontinence     Visual impairment      Past Surgical History:   Procedure Laterality Date    BRAIN SURGERY      2013 and december     SECTION      x`s 3    CHOLECYSTECTOMY      COLONOSCOPY      -Dr. Odonnell    TONSILLECTOMY      TUBAL LIGATION  2011    TYMPANOPLASTY Bilateral     artificial ear drums    UPPER  antibiotics, will defer to primary team    RECOMMENDATIONS:    -IV antiemetics  -IV fluids  -IV PPI twice daily  -Scopolamine patch  -EGD 3/17/2025    If you have any questions or need any further information, please feel free to contact our consult team.  Thank you for allowing us to participate in the care of Lydiabibiana Harris.    Raz Martin MD  [facility]

## 2025-03-15 NOTE — PROGRESS NOTES
4 Eyes Skin Assessment     NAME:  Lydia Harris  YOB: 1970  MEDICAL RECORD NUMBER:  [id number]    The patient is being assessed for  Admission    I agree that at least one RN has performed a thorough Head to Toe Skin Assessment on the patient. ALL assessment sites listed below have been assessed.      Areas assessed by both nurses:    Head, Face, Ears, Shoulders, Back, Chest, Arms, Elbows, Hands, Sacrum. Buttock, Coccyx, Ischium, Legs. Feet and Heels, and Under Medical Devices         Does the Patient have a Wound? No noted wound(s)       Ricardo Prevention initiated by RN: Yes  Wound Care Orders initiated by RN: No    Pressure Injury (Stage 3,4, Unstageable, DTI, NWPT, and Complex wounds) if present, place Wound referral order by RN under : No    New Ostomies, if present place, Ostomy referral order under : No     Nurse 1 eSignature: Electronically signed by Peggy Queen RN on 3/14/25 at 8:55 PM EDT    **SHARE this note so that the co-signing nurse can place an eSignature**    Nurse 2 eSignature: Electronically signed by Mya Jolley RN on 3/15/25 at 6:57 AM EDT

## 2025-03-15 NOTE — PROGRESS NOTES
NAME:  Lydia Harris  YOB: 1970  MEDICAL RECORD NUMBER:  [id number]    Shift Summary: Replaced x6 bags K, K now 3.7, creat improving at 2.6, fluids running  GI consulted  Family updated: Yes:  by patient via cellphone    Rhythm: Normal Sinus Rhythm     Most recent vitals:   Visit Vitals  /76   Pulse 67   Temp 97.6 °F (36.4 °C) (Oral)   Resp 18   Ht 1.499 m (4' 11\")   Wt 85.2 kg (187 lb 13.3 oz)   SpO2 98%   BMI 37.94 kg/m²           No data found.    No data found.      Respiratory support needed (if any):  - RA    Admission weight Weight - Scale: 93.5 kg (206 lb 2.1 oz) (03/14/25 1154)    Today's weight    Wt Readings from Last 1 Encounters:   03/14/25 85.2 kg (187 lb 13.3 oz)        Huang need assessed each shift: N/A - no huang present  UOP >30ml/hr: YES  Last documented BM (in last 48 hrs):  Patient Vitals for the past 48 hrs:   Last BM (including prior to admit) Stool Occurrence   03/14/25 1900 03/14/25 1   03/14/25 2000 03/14/25 --   03/15/25 0008 -- 1                Restraints (in use currently or dc'd in last 12 hrs): No    Order current and documentation up to date? No    Lines/Drains reviewed @ bedside.  Peripheral IV 03/14/25 Right Antecubital (Active)   Number of days: 0         Drip rates at handoff:    sodium chloride      sodium chloride 100 mL/hr at 03/15/25 0220    lactated ringers         Lab Data:   CBC:   Recent Labs     03/14/25  1240   WBC 17.5*   HGB 15.6   HCT 45.5   MCV 82.2        BMP:    Recent Labs     03/14/25  1934 03/15/25  0121    140   K 2.9* 3.7   CO2 26 28   BUN 52* 49*   CREATININE 3.1* 2.6*     ABG: No results for input(s): \"PHART\", \"ACM8JGO\", \"PO2ART\" in the last 72 hours.    Any consults during the shift? No    Any signed and held orders to be released?  No        4 Eyes Skin Assessment       The patient is being assessed for  Shift Handoff    I agree that at least one RN has performed a thorough Head to Toe Skin Assessment on the  patient. ALL assessment sites listed below have been assessed.      Areas assessed by both nurses: Head, Face, Ears, Shoulders, Back, Chest, Arms, Elbows, Hands, Sacrum. Buttock, Coccyx, Ischium, Legs. Feet and Heels, and Under Medical Devices         Does the Patient have a Wound? No noted wound(s)    Wound Care Orders initiated by RN: No       Ricardo Prevention initiated by RN: Yes    Pressure Injury (Stage 3,4, Unstageable, DTI, NWPT, and Complex wounds) if present, place Wound referral order by RN under : No    New Ostomies, if present place, Ostomy referral order under : No     Nurse 1 eSignature: Electronically signed by Peggy Quene RN on 3/15/25 at 3:03 AM EDT    **SHARE this note so that the co-signing nurse can place an eSignature**    Nurse 2 eSignature: Electronically signed by Audrey Mahajan RN on 3/15/25 at 3:34 AM EDT

## 2025-03-15 NOTE — PROGRESS NOTES
Pt arrived to room via w/c able to stand pivot to bed without difficulty. Alert oriented x 4. Pt legally blind. Placed tele lead on call light so pt could feel where nurse call button is. Thanked nurse.no c/o at present no s/s distress.

## 2025-03-15 NOTE — PROGRESS NOTES
Per NP Pretty Hua, finish bag of NS @ 100, then discontinue order and start LR gtt.    Electronically signed by Peggy Queen RN on 3/15/2025 at 2:35 AM

## 2025-03-15 NOTE — PLAN OF CARE
Problem: Chronic Conditions and Co-morbidities  Goal: Patient's chronic conditions and co-morbidity symptoms are monitored and maintained or improved  Outcome: Progressing  Flowsheets (Taken 3/14/2025 2000)  Care Plan - Patient's Chronic Conditions and Co-Morbidity Symptoms are Monitored and Maintained or Improved:   Monitor and assess patient's chronic conditions and comorbid symptoms for stability, deterioration, or improvement   Collaborate with multidisciplinary team to address chronic and comorbid conditions and prevent exacerbation or deterioration   Update acute care plan with appropriate goals if chronic or comorbid symptoms are exacerbated and prevent overall improvement and discharge     Problem: Discharge Planning  Goal: Discharge to home or other facility with appropriate resources  Outcome: Progressing  Flowsheets (Taken 3/14/2025 2000)  Discharge to home or other facility with appropriate resources:   Identify barriers to discharge with patient and caregiver   Arrange for needed discharge resources and transportation as appropriate   Arrange for interpreters to assist at discharge as needed   Refer to discharge planning if patient needs post-hospital services based on physician order or complex needs related to functional status, cognitive ability or social support system   Identify discharge learning needs (meds, wound care, etc)     Problem: Pain  Goal: Verbalizes/displays adequate comfort level or baseline comfort level  Outcome: Progressing  Flowsheets (Taken 3/14/2025 2000)  Verbalizes/displays adequate comfort level or baseline comfort level:   Encourage patient to monitor pain and request assistance   Assess pain using appropriate pain scale   Administer analgesics based on type and severity of pain and evaluate response   Consider cultural and social influences on pain and pain management   Notify Licensed Independent Practitioner if interventions unsuccessful or patient reports new pain    Implement non-pharmacological measures as appropriate and evaluate response     Problem: Safety - Adult  Goal: Free from fall injury  Outcome: Progressing     Problem: Neurosensory - Adult  Goal: Achieves stable or improved neurological status  Outcome: Progressing  Goal: Achieves maximal functionality and self care  Outcome: Progressing     Problem: Skin/Tissue Integrity - Adult  Goal: Skin integrity remains intact  Outcome: Progressing  Goal: Oral mucous membranes remain intact  Outcome: Progressing     Problem: Musculoskeletal - Adult  Goal: Return mobility to safest level of function  Outcome: Progressing  Goal: Return ADL status to a safe level of function  Outcome: Progressing     Problem: Gastrointestinal - Adult  Goal: Minimal or absence of nausea and vomiting  Outcome: Progressing  Goal: Maintains or returns to baseline bowel function  Outcome: Progressing     Problem: Genitourinary - Adult  Goal: Absence of urinary retention  Outcome: Progressing

## 2025-03-15 NOTE — PROGRESS NOTES
Occupational Therapy  Facility/Department: [address] MED SURG  Occupational Therapy Initial Assessment    Name: Lydia Harris  : 1970  MRN: [id number]  Date of Service: 3/15/2025    Discharge Recommendations:  Home with Home health OT  OT Equipment Recommendations  Equipment Needed: No     Lydia Harris scored a 18/24 on the AM-PAC ADL Inpatient form. Current research shows that an AM-PAC score of 18 or greater is typically associated with a discharge to the patient's home setting. Based on the patient's AM-PAC score, and their current ADL deficits, it is recommended that the patient have 2-3 sessions per week of Occupational Therapy at d/c to increase the patient's independence.  At this time, this patient demonstrates the endurance and safety to discharge home with home therapy and a follow up treatment frequency of 2-3x/wk.   Please see assessment section for further patient specific details.    If patient discharges prior to next session this note will serve as a discharge summary.  Please see below for the latest assessment towards goals.      Patient Diagnosis(es): The primary encounter diagnosis was Bilious vomiting with nausea. A diagnosis of Generalized abdominal pain was also pertinent to this visit.  Past Medical History:  has a past medical history of Brain tumor (HCC), Depression, GERD (gastroesophageal reflux disease), Gestational diabetes, Heavy periods, Hypertension, Legally blind, Migraines, Seizures (HCC), Urinary incontinence, and Visual impairment.  Past Surgical History:  has a past surgical history that includes Cholecystectomy; brain surgery; Tubal ligation ();  section; Tonsillectomy; Tympanoplasty (Bilateral); Upper gastrointestinal endoscopy; and Colonoscopy.      Assessment  Performance deficits / Impairments: Decreased functional mobility ;Decreased ADL status;Decreased strength;Decreased safe awareness;Decreased balance;Decreased fine motor control;Decreased

## 2025-03-15 NOTE — PLAN OF CARE
Problem: Chronic Conditions and Co-morbidities  Goal: Patient's chronic conditions and co-morbidity symptoms are monitored and maintained or improved  Outcome: Progressing     Problem: Discharge Planning  Goal: Discharge to home or other facility with appropriate resources  Outcome: Progressing     Problem: Pain  Goal: Verbalizes/displays adequate comfort level or baseline comfort level  Outcome: Progressing     Problem: Safety - Adult  Goal: Free from fall injury  Outcome: Progressing     Problem: Neurosensory - Adult  Goal: Achieves stable or improved neurological status  Outcome: Progressing  Goal: Achieves maximal functionality and self care  Outcome: Progressing     Problem: Skin/Tissue Integrity - Adult  Goal: Skin integrity remains intact  Description: 1.  Monitor for areas of redness and/or skin breakdown  2.  Assess vascular access sites hourly  3.  Every 4-6 hours minimum:  Change oxygen saturation probe site  4.  Every 4-6 hours:  If on nasal continuous positive airway pressure, respiratory therapy assess nares and determine need for appliance change or resting period  Outcome: Progressing  Goal: Oral mucous membranes remain intact  Outcome: Progressing     Problem: Musculoskeletal - Adult  Goal: Return mobility to safest level of function  Outcome: Progressing  Goal: Return ADL status to a safe level of function  Outcome: Progressing     Problem: Gastrointestinal - Adult  Goal: Minimal or absence of nausea and vomiting  Outcome: Progressing  Goal: Maintains or returns to baseline bowel function  Outcome: Progressing     Problem: Genitourinary - Adult  Goal: Absence of urinary retention  Outcome: Progressing     Problem: Skin/Tissue Integrity  Goal: Skin integrity remains intact  Description: 1.  Monitor for areas of redness and/or skin breakdown  2.  Assess vascular access sites hourly  3.  Every 4-6 hours minimum:  Change oxygen saturation probe site  4.  Every 4-6 hours:  If on nasal continuous  positive airway pressure, respiratory therapy assess nares and determine need for appliance change or resting period  Outcome: Progressing     Problem: Infection - Adult  Goal: Absence of infection at discharge  Outcome: Progressing

## 2025-03-16 LAB
ALBUMIN SERPL-MCNC: 4 G/DL (ref 3.4–5)
ALBUMIN/GLOB SERPL: 1.5 {RATIO} (ref 1.1–2.2)
ALP SERPL-CCNC: 87 U/L (ref 40–129)
ALT SERPL-CCNC: 12 U/L (ref 10–40)
ANION GAP SERPL CALCULATED.3IONS-SCNC: 14 MMOL/L (ref 3–16)
AST SERPL-CCNC: 18 U/L (ref 15–37)
BACTERIA UR CULT: ABNORMAL
BACTERIA UR CULT: ABNORMAL
BASOPHILS # BLD: 0.1 K/UL (ref 0–0.2)
BASOPHILS NFR BLD: 0.6 %
BILIRUB SERPL-MCNC: 0.7 MG/DL (ref 0–1)
BUN SERPL-MCNC: 20 MG/DL (ref 7–20)
C DIFF TOX A+B STL QL IA: NORMAL
CALCIUM SERPL-MCNC: 9.5 MG/DL (ref 8.3–10.6)
CHLORIDE SERPL-SCNC: 98 MMOL/L (ref 99–110)
CO2 SERPL-SCNC: 29 MMOL/L (ref 21–32)
CREAT SERPL-MCNC: 1 MG/DL (ref 0.6–1.1)
DEPRECATED RDW RBC AUTO: 13.8 % (ref 12.4–15.4)
EOSINOPHIL # BLD: 0.2 K/UL (ref 0–0.6)
EOSINOPHIL NFR BLD: 2.1 %
GFR SERPLBLD CREATININE-BSD FMLA CKD-EPI: 68 ML/MIN/{1.73_M2}
GLUCOSE SERPL-MCNC: 112 MG/DL (ref 70–99)
HCT VFR BLD AUTO: 38.1 % (ref 36–48)
HGB BLD-MCNC: 13.4 G/DL (ref 12–16)
LYMPHOCYTES # BLD: 2.8 K/UL (ref 1–5.1)
LYMPHOCYTES NFR BLD: 26 %
MCH RBC QN AUTO: 28.7 PG (ref 26–34)
MCHC RBC AUTO-ENTMCNC: 35.3 G/DL (ref 31–36)
MCV RBC AUTO: 81.4 FL (ref 80–100)
MONOCYTES # BLD: 1 K/UL (ref 0–1.3)
MONOCYTES NFR BLD: 9.3 %
NEUTROPHILS # BLD: 6.7 K/UL (ref 1.7–7.7)
NEUTROPHILS NFR BLD: 62 %
ORGANISM: ABNORMAL
PLATELET # BLD AUTO: 241 K/UL (ref 135–450)
PMV BLD AUTO: 9.4 FL (ref 5–10.5)
POTASSIUM SERPL-SCNC: 2.8 MMOL/L (ref 3.5–5.1)
PROT SERPL-MCNC: 6.6 G/DL (ref 6.4–8.2)
RBC # BLD AUTO: 4.67 M/UL (ref 4–5.2)
SODIUM SERPL-SCNC: 141 MMOL/L (ref 136–145)
WBC # BLD AUTO: 10.8 K/UL (ref 4–11)

## 2025-03-16 PROCEDURE — 2500000003 HC RX 250 WO HCPCS: Performed by: INTERNAL MEDICINE

## 2025-03-16 PROCEDURE — 94640 AIRWAY INHALATION TREATMENT: CPT

## 2025-03-16 PROCEDURE — 6360000002 HC RX W HCPCS: Performed by: INTERNAL MEDICINE

## 2025-03-16 PROCEDURE — 87324 CLOSTRIDIUM AG IA: CPT

## 2025-03-16 PROCEDURE — 2580000003 HC RX 258: Performed by: INTERNAL MEDICINE

## 2025-03-16 PROCEDURE — 80053 COMPREHEN METABOLIC PANEL: CPT

## 2025-03-16 PROCEDURE — 85025 COMPLETE CBC W/AUTO DIFF WBC: CPT

## 2025-03-16 PROCEDURE — 87449 NOS EACH ORGANISM AG IA: CPT

## 2025-03-16 PROCEDURE — 6370000000 HC RX 637 (ALT 250 FOR IP): Performed by: INTERNAL MEDICINE

## 2025-03-16 PROCEDURE — 94760 N-INVAS EAR/PLS OXIMETRY 1: CPT

## 2025-03-16 PROCEDURE — 6360000002 HC RX W HCPCS: Performed by: HOSPITALIST

## 2025-03-16 PROCEDURE — 1200000000 HC SEMI PRIVATE

## 2025-03-16 PROCEDURE — 36415 COLL VENOUS BLD VENIPUNCTURE: CPT

## 2025-03-16 RX ORDER — POTASSIUM CHLORIDE 1500 MG/1
40 TABLET, EXTENDED RELEASE ORAL PRN
Status: DISCONTINUED | OUTPATIENT
Start: 2025-03-16 | End: 2025-03-21 | Stop reason: HOSPADM

## 2025-03-16 RX ORDER — ENOXAPARIN SODIUM 100 MG/ML
40 INJECTION SUBCUTANEOUS DAILY
Status: DISCONTINUED | OUTPATIENT
Start: 2025-03-17 | End: 2025-03-21 | Stop reason: HOSPADM

## 2025-03-16 RX ORDER — POTASSIUM CHLORIDE 7.45 MG/ML
10 INJECTION INTRAVENOUS PRN
Status: DISCONTINUED | OUTPATIENT
Start: 2025-03-16 | End: 2025-03-21 | Stop reason: HOSPADM

## 2025-03-16 RX ADMIN — METRONIDAZOLE 500 MG: 500 INJECTION, SOLUTION INTRAVENOUS at 22:37

## 2025-03-16 RX ADMIN — SODIUM CHLORIDE, POTASSIUM CHLORIDE, SODIUM LACTATE AND CALCIUM CHLORIDE: 600; 310; 30; 20 INJECTION, SOLUTION INTRAVENOUS at 01:20

## 2025-03-16 RX ADMIN — METRONIDAZOLE 500 MG: 500 INJECTION, SOLUTION INTRAVENOUS at 11:54

## 2025-03-16 RX ADMIN — SODIUM CHLORIDE, PRESERVATIVE FREE 10 ML: 5 INJECTION INTRAVENOUS at 20:51

## 2025-03-16 RX ADMIN — LEVETIRACETAM 1000 MG: 100 INJECTION INTRAVENOUS at 21:00

## 2025-03-16 RX ADMIN — SODIUM CHLORIDE, POTASSIUM CHLORIDE, SODIUM LACTATE AND CALCIUM CHLORIDE: 600; 310; 30; 20 INJECTION, SOLUTION INTRAVENOUS at 08:16

## 2025-03-16 RX ADMIN — POTASSIUM CHLORIDE 10 MEQ: 7.46 INJECTION, SOLUTION INTRAVENOUS at 06:37

## 2025-03-16 RX ADMIN — WATER 1000 MG: 1 INJECTION INTRAMUSCULAR; INTRAVENOUS; SUBCUTANEOUS at 16:29

## 2025-03-16 RX ADMIN — POTASSIUM CHLORIDE 10 MEQ: 7.46 INJECTION, SOLUTION INTRAVENOUS at 08:13

## 2025-03-16 RX ADMIN — POTASSIUM CHLORIDE 10 MEQ: 7.46 INJECTION, SOLUTION INTRAVENOUS at 14:23

## 2025-03-16 RX ADMIN — METRONIDAZOLE 500 MG: 500 INJECTION, SOLUTION INTRAVENOUS at 03:53

## 2025-03-16 RX ADMIN — POTASSIUM CHLORIDE 10 MEQ: 7.46 INJECTION, SOLUTION INTRAVENOUS at 21:09

## 2025-03-16 RX ADMIN — POTASSIUM CHLORIDE 10 MEQ: 7.46 INJECTION, SOLUTION INTRAVENOUS at 12:08

## 2025-03-16 RX ADMIN — LEVETIRACETAM 1000 MG: 100 INJECTION INTRAVENOUS at 08:28

## 2025-03-16 RX ADMIN — POTASSIUM CHLORIDE 10 MEQ: 7.46 INJECTION, SOLUTION INTRAVENOUS at 17:53

## 2025-03-16 RX ADMIN — SODIUM CHLORIDE, PRESERVATIVE FREE 40 MG: 5 INJECTION INTRAVENOUS at 08:21

## 2025-03-16 RX ADMIN — ENOXAPARIN SODIUM 30 MG: 100 INJECTION SUBCUTANEOUS at 08:19

## 2025-03-16 RX ADMIN — BUDESONIDE AND FORMOTEROL FUMARATE DIHYDRATE 2 PUFF: 80; 4.5 AEROSOL RESPIRATORY (INHALATION) at 09:42

## 2025-03-16 RX ADMIN — SODIUM CHLORIDE, PRESERVATIVE FREE 40 MG: 5 INJECTION INTRAVENOUS at 20:51

## 2025-03-16 NOTE — PLAN OF CARE
Problem: Chronic Conditions and Co-morbidities  Goal: Patient's chronic conditions and co-morbidity symptoms are monitored and maintained or improved  3/16/2025 0030 by Bisi Salguero RN  Outcome: Progressing  Flowsheets (Taken 3/15/2025 2037)  Care Plan - Patient's Chronic Conditions and Co-Morbidity Symptoms are Monitored and Maintained or Improved: Monitor and assess patient's chronic conditions and comorbid symptoms for stability, deterioration, or improvement  3/15/2025 1850 by Stephan Finley RN  Outcome: Progressing     Problem: Discharge Planning  Goal: Discharge to home or other facility with appropriate resources  3/16/2025 0030 by Bisi Salguero RN  Outcome: Progressing  Flowsheets (Taken 3/15/2025 2037)  Discharge to home or other facility with appropriate resources: Identify barriers to discharge with patient and caregiver  3/15/2025 1850 by Stephan Finley RN  Outcome: Progressing     Problem: Pain  Goal: Verbalizes/displays adequate comfort level or baseline comfort level  3/16/2025 0030 by Bisi Salguero RN  Outcome: Progressing  3/15/2025 1850 by Stephan Finley RN  Outcome: Progressing     Problem: Safety - Adult  Goal: Free from fall injury  3/16/2025 0030 by Bisi Salugero RN  Outcome: Progressing  3/15/2025 1850 by Stephan Finley RN  Outcome: Progressing     Problem: Neurosensory - Adult  Goal: Achieves stable or improved neurological status  3/16/2025 0030 by Bisi Salguero RN  Outcome: Progressing  Flowsheets (Taken 3/15/2025 2037)  Achieves stable or improved neurological status: Assess for and report changes in neurological status  3/15/2025 1850 by Stephan Finley RN  Outcome: Progressing  Goal: Achieves maximal functionality and self care  3/16/2025 0030 by Bisi Salguero RN  Outcome: Progressing  Flowsheets (Taken 3/15/2025 2037)  Achieves maximal functionality and self care:   Monitor swallowing and airway patency with patient fatigue and changes in neurological status    Respiratory - Adult  Goal: Achieves optimal ventilation and oxygenation  Outcome: Progressing  Flowsheets (Taken 3/15/2025 2037)  Achieves optimal ventilation and oxygenation: Assess for changes in respiratory status

## 2025-03-16 NOTE — PLAN OF CARE
Problem: Chronic Conditions and Co-morbidities  Goal: Patient's chronic conditions and co-morbidity symptoms are monitored and maintained or improved  Outcome: Progressing     Problem: Discharge Planning  Goal: Discharge to home or other facility with appropriate resources  Outcome: Progressing     Problem: Pain  Goal: Verbalizes/displays adequate comfort level or baseline comfort level  Outcome: Progressing     Problem: Safety - Adult  Goal: Free from fall injury  Outcome: Progressing     Problem: Neurosensory - Adult  Goal: Achieves stable or improved neurological status  Outcome: Progressing  Goal: Achieves maximal functionality and self care  Outcome: Progressing     Problem: Skin/Tissue Integrity - Adult  Goal: Skin integrity remains intact  Description: 1.  Monitor for areas of redness and/or skin breakdown  2.  Assess vascular access sites hourly  3.  Every 4-6 hours minimum:  Change oxygen saturation probe site  4.  Every 4-6 hours:  If on nasal continuous positive airway pressure, respiratory therapy assess nares and determine need for appliance change or resting period  Outcome: Progressing  Goal: Oral mucous membranes remain intact  Outcome: Progressing     Problem: Musculoskeletal - Adult  Goal: Return mobility to safest level of function  Outcome: Progressing  Goal: Return ADL status to a safe level of function  Outcome: Progressing     Problem: Gastrointestinal - Adult  Goal: Minimal or absence of nausea and vomiting  Outcome: Progressing  Goal: Maintains or returns to baseline bowel function  Outcome: Progressing     Problem: Genitourinary - Adult  Goal: Absence of urinary retention  Outcome: Progressing     Problem: Skin/Tissue Integrity  Goal: Skin integrity remains intact  Description: 1.  Monitor for areas of redness and/or skin breakdown  2.  Assess vascular access sites hourly  3.  Every 4-6 hours minimum:  Change oxygen saturation probe site  4.  Every 4-6 hours:  If on nasal continuous  positive airway pressure, respiratory therapy assess nares and determine need for appliance change or resting period  Outcome: Progressing     Problem: Infection - Adult  Goal: Absence of infection at discharge  Outcome: Progressing     Problem: Respiratory - Adult  Goal: Achieves optimal ventilation and oxygenation  Outcome: Progressing

## 2025-03-16 NOTE — PROGRESS NOTES
GASTROENTEROLOGY INPATIENT CONSULTATION      IDENTIFYING DATA/REASON FOR CONSULTATION   PATIENT:  Lydia Harris  MRN:  [id number]  ADMIT DATE: 3/14/2025  TIME OF EVALUATION: 3/16/2025 5:51 AM  HOSPITAL STAY:   LOS: 2 days     REASON FOR CONSULTATION: Nausea and vomiting    SUBJECTIVE   Patient seen and examined.  Patient reports ongoing nausea and vomiting.  She has been unable to tolerate liquids or oral pills.    PAST MEDICAL, SURGICAL, FAMILY, and SOCIAL HISTORY     Past Medical History:   Diagnosis Date    Brain tumor (HCC)     one surgery november and one december     Depression     GERD (gastroesophageal reflux disease)     Gestational diabetes     Heavy periods     Hypertension     Legally blind     Migraines     Seizures (HCC)     Urinary incontinence     Visual impairment      Past Surgical History:   Procedure Laterality Date    BRAIN SURGERY      2013 and december     SECTION      x`s 3    CHOLECYSTECTOMY      COLONOSCOPY      -Dr. Odonnell    TONSILLECTOMY      TUBAL LIGATION  2011    TYMPANOPLASTY Bilateral     artificial ear drums    UPPER GASTROINTESTINAL ENDOSCOPY      -Dr. Odonnell     Family History   Problem Relation Age of Onset    Cancer Mother     Heart Disease Father     Diabetes Father      Social History     Socioeconomic History    Marital status:      Spouse name: None    Number of children: None    Years of education: None    Highest education level: None   Tobacco Use    Smoking status: Former     Current packs/day: 0.00     Types: Cigarettes     Quit date: 1996     Years since quittin.8    Smokeless tobacco: Never   Vaping Use    Vaping status: Never Used   Substance and Sexual Activity    Alcohol use: No    Drug use: No    Sexual activity: Not Currently     Social Drivers of Health     Food Insecurity: No Food Insecurity (3/14/2025)    Hunger Vital Sign     Worried About Running Out of Food in the Last Year: Never true     Ran Out  and unremarkable LFTs, making choledocholithiasis unlikely.  Will plan for EGD 3/17/2025 to rule out gastric outlet obstruction.  Elevated lipase: While patient has abdominal discomfort, her pain is not typical for acute pancreatitis, and while lipase is elevated, her pancreas is normal on CT. it is unclear if the patient truly has acute pancreatitis or not.  Patient has no risk factors for pancreatitis given lack of alcohol use, no new inciting drugs, and history of cholecystectomy.  Given inability to tolerate p.o. intake, would treat for acute pancreatitis with bowel rest, clear liquid diet as tolerated, and IV fluid support with 1.5 mL/kg LR.  Hypokalemia: Replace and monitor  Hypercalcemia: Resolved  Prerenal JANIE: Improving with IV fluids  UTI: On antibiotics, will defer to primary team    RECOMMENDATIONS:    -IV antiemetics  -IV fluids  -IV PPI twice daily  -Scopolamine patch  -EGD 3/17/2025    If you have any questions or need any further information, please feel free to contact our consult team.  Thank you for allowing us to participate in the care of Lydia Harris.    Raz Martin MD  [facility]

## 2025-03-16 NOTE — PROGRESS NOTES
Discussed risks, benefits, and alternatives with patient.  regrding Zofran and Compazine IV PRN for vomiting and all other due PO meds.     Patient reason for declining  treatment: PO meds. Makes her nauseated and leads to intractable vomiting. Pt. Also stated. she is afraid to get  medication overdose since she already have Scopolamine patch applied.     The following provider was notified of patient's intent to refuse: Dr. Bell Salomon    Feedback from provider: No new orders    Alternative interventions used in the meantime: Scopolamine patch in placed. Ice chips offered.     Electronically signed by Bisi Salguero RN on 3/16/25 at 5:59 AM EDT

## 2025-03-16 NOTE — PROGRESS NOTES
K= 2.8. There is no ordered Potassium Replacement protocol.    Dr. Umm Luu notified, awaiting for response.       0630- Obtained an order, acknowledged.     Electronically signed by Bisi Salguero RN on 3/16/25 at 6:18 AM EDT

## 2025-03-17 ENCOUNTER — ANESTHESIA (OUTPATIENT)
Dept: ENDOSCOPY | Age: 55
End: 2025-03-17
Payer: MEDICARE

## 2025-03-17 ENCOUNTER — ANESTHESIA EVENT (OUTPATIENT)
Dept: ENDOSCOPY | Age: 55
End: 2025-03-17
Payer: MEDICARE

## 2025-03-17 LAB
ANION GAP SERPL CALCULATED.3IONS-SCNC: 15 MMOL/L (ref 3–16)
BASOPHILS # BLD: 0 K/UL (ref 0–0.2)
BASOPHILS NFR BLD: 0 %
BUN SERPL-MCNC: 8 MG/DL (ref 7–20)
CALCIUM SERPL-MCNC: 8.8 MG/DL (ref 8.3–10.6)
CHLORIDE SERPL-SCNC: 98 MMOL/L (ref 99–110)
CO2 SERPL-SCNC: 25 MMOL/L (ref 21–32)
CREAT SERPL-MCNC: 0.8 MG/DL (ref 0.6–1.1)
DEPRECATED RDW RBC AUTO: 13.8 % (ref 12.4–15.4)
EOSINOPHIL # BLD: 0 K/UL (ref 0–0.6)
EOSINOPHIL NFR BLD: 0 %
GFR SERPLBLD CREATININE-BSD FMLA CKD-EPI: 88 ML/MIN/{1.73_M2}
GLUCOSE SERPL-MCNC: 111 MG/DL (ref 70–99)
HCT VFR BLD AUTO: 45.5 % (ref 36–48)
HGB BLD-MCNC: 15.6 G/DL (ref 12–16)
LYMPHOCYTES # BLD: 3.9 K/UL (ref 1–5.1)
LYMPHOCYTES NFR BLD: 21 %
MCH RBC QN AUTO: 28.1 PG (ref 26–34)
MCHC RBC AUTO-ENTMCNC: 34.2 G/DL (ref 31–36)
MCV RBC AUTO: 82.2 FL (ref 80–100)
MONOCYTES # BLD: 1.9 K/UL (ref 0–1.3)
MONOCYTES NFR BLD: 11 %
NEUTROPHILS # BLD: 11.7 K/UL (ref 1.7–7.7)
NEUTROPHILS NFR BLD: 64 %
NEUTS BAND NFR BLD MANUAL: 3 % (ref 0–7)
PATH INTERP BLD-IMP: NO
PLATELET # BLD AUTO: 415 K/UL (ref 135–450)
PLATELET BLD QL SMEAR: ADEQUATE
PMV BLD AUTO: 9.6 FL (ref 5–10.5)
POTASSIUM SERPL-SCNC: 2.9 MMOL/L (ref 3.5–5.1)
RBC # BLD AUTO: 5.54 M/UL (ref 4–5.2)
RBC MORPH BLD: NORMAL
SLIDE REVIEW: ABNORMAL
SODIUM SERPL-SCNC: 138 MMOL/L (ref 136–145)
VARIANT LYMPHS NFR BLD MANUAL: 1 % (ref 0–6)
WBC # BLD AUTO: 17.5 K/UL (ref 4–11)

## 2025-03-17 PROCEDURE — 7100000001 HC PACU RECOVERY - ADDTL 15 MIN: Performed by: INTERNAL MEDICINE

## 2025-03-17 PROCEDURE — 80048 BASIC METABOLIC PNL TOTAL CA: CPT

## 2025-03-17 PROCEDURE — 7100000000 HC PACU RECOVERY - FIRST 15 MIN: Performed by: INTERNAL MEDICINE

## 2025-03-17 PROCEDURE — 1200000000 HC SEMI PRIVATE

## 2025-03-17 PROCEDURE — 0DB68ZX EXCISION OF STOMACH, VIA NATURAL OR ARTIFICIAL OPENING ENDOSCOPIC, DIAGNOSTIC: ICD-10-PCS | Performed by: INTERNAL MEDICINE

## 2025-03-17 PROCEDURE — 3700000001 HC ADD 15 MINUTES (ANESTHESIA): Performed by: INTERNAL MEDICINE

## 2025-03-17 PROCEDURE — 97530 THERAPEUTIC ACTIVITIES: CPT

## 2025-03-17 PROCEDURE — 88305 TISSUE EXAM BY PATHOLOGIST: CPT

## 2025-03-17 PROCEDURE — 36415 COLL VENOUS BLD VENIPUNCTURE: CPT

## 2025-03-17 PROCEDURE — 6360000002 HC RX W HCPCS: Performed by: HOSPITALIST

## 2025-03-17 PROCEDURE — 94640 AIRWAY INHALATION TREATMENT: CPT

## 2025-03-17 PROCEDURE — 94760 N-INVAS EAR/PLS OXIMETRY 1: CPT

## 2025-03-17 PROCEDURE — 2709999900 HC NON-CHARGEABLE SUPPLY: Performed by: INTERNAL MEDICINE

## 2025-03-17 PROCEDURE — 3609012400 HC EGD TRANSORAL BIOPSY SINGLE/MULTIPLE: Performed by: INTERNAL MEDICINE

## 2025-03-17 PROCEDURE — 97162 PT EVAL MOD COMPLEX 30 MIN: CPT

## 2025-03-17 PROCEDURE — 2580000003 HC RX 258: Performed by: INTERNAL MEDICINE

## 2025-03-17 PROCEDURE — 2580000003 HC RX 258

## 2025-03-17 PROCEDURE — 6360000002 HC RX W HCPCS

## 2025-03-17 PROCEDURE — 6370000000 HC RX 637 (ALT 250 FOR IP): Performed by: INTERNAL MEDICINE

## 2025-03-17 PROCEDURE — 3700000000 HC ANESTHESIA ATTENDED CARE: Performed by: INTERNAL MEDICINE

## 2025-03-17 PROCEDURE — 6360000002 HC RX W HCPCS: Performed by: INTERNAL MEDICINE

## 2025-03-17 PROCEDURE — 2500000003 HC RX 250 WO HCPCS: Performed by: INTERNAL MEDICINE

## 2025-03-17 RX ORDER — PROPOFOL 10 MG/ML
INJECTION, EMULSION INTRAVENOUS
Status: DISCONTINUED | OUTPATIENT
Start: 2025-03-17 | End: 2025-03-17 | Stop reason: SDUPTHER

## 2025-03-17 RX ORDER — NALOXONE HYDROCHLORIDE 0.4 MG/ML
INJECTION, SOLUTION INTRAMUSCULAR; INTRAVENOUS; SUBCUTANEOUS PRN
Status: DISCONTINUED | OUTPATIENT
Start: 2025-03-17 | End: 2025-03-21

## 2025-03-17 RX ORDER — SODIUM CHLORIDE 0.9 % (FLUSH) 0.9 %
5-40 SYRINGE (ML) INJECTION EVERY 12 HOURS SCHEDULED
Status: DISCONTINUED | OUTPATIENT
Start: 2025-03-17 | End: 2025-03-21

## 2025-03-17 RX ORDER — LIDOCAINE HYDROCHLORIDE 20 MG/ML
INJECTION, SOLUTION EPIDURAL; INFILTRATION; INTRACAUDAL; PERINEURAL
Status: DISCONTINUED | OUTPATIENT
Start: 2025-03-17 | End: 2025-03-17 | Stop reason: SDUPTHER

## 2025-03-17 RX ORDER — ONDANSETRON 2 MG/ML
4 INJECTION INTRAMUSCULAR; INTRAVENOUS
Status: DISCONTINUED | OUTPATIENT
Start: 2025-03-17 | End: 2025-03-18 | Stop reason: HOSPADM

## 2025-03-17 RX ORDER — FENTANYL CITRATE 0.05 MG/ML
50 INJECTION, SOLUTION INTRAMUSCULAR; INTRAVENOUS EVERY 5 MIN PRN
Status: DISCONTINUED | OUTPATIENT
Start: 2025-03-17 | End: 2025-03-18 | Stop reason: HOSPADM

## 2025-03-17 RX ORDER — SODIUM CHLORIDE 9 MG/ML
INJECTION, SOLUTION INTRAVENOUS PRN
Status: DISCONTINUED | OUTPATIENT
Start: 2025-03-17 | End: 2025-03-21

## 2025-03-17 RX ORDER — SODIUM CHLORIDE 9 MG/ML
INJECTION, SOLUTION INTRAVENOUS
Status: DISCONTINUED | OUTPATIENT
Start: 2025-03-17 | End: 2025-03-17 | Stop reason: SDUPTHER

## 2025-03-17 RX ORDER — MEPERIDINE HYDROCHLORIDE 25 MG/ML
12.5 INJECTION INTRAMUSCULAR; INTRAVENOUS; SUBCUTANEOUS EVERY 5 MIN PRN
Status: DISCONTINUED | OUTPATIENT
Start: 2025-03-17 | End: 2025-03-18 | Stop reason: HOSPADM

## 2025-03-17 RX ORDER — FENTANYL CITRATE 0.05 MG/ML
25 INJECTION, SOLUTION INTRAMUSCULAR; INTRAVENOUS EVERY 5 MIN PRN
Status: DISCONTINUED | OUTPATIENT
Start: 2025-03-17 | End: 2025-03-18

## 2025-03-17 RX ORDER — SODIUM CHLORIDE 0.9 % (FLUSH) 0.9 %
5-40 SYRINGE (ML) INJECTION PRN
Status: DISCONTINUED | OUTPATIENT
Start: 2025-03-17 | End: 2025-03-21

## 2025-03-17 RX ADMIN — BUDESONIDE AND FORMOTEROL FUMARATE DIHYDRATE 2 PUFF: 80; 4.5 AEROSOL RESPIRATORY (INHALATION) at 20:25

## 2025-03-17 RX ADMIN — ATORVASTATIN CALCIUM 80 MG: 80 TABLET, FILM COATED ORAL at 20:49

## 2025-03-17 RX ADMIN — METRONIDAZOLE 500 MG: 500 INJECTION, SOLUTION INTRAVENOUS at 14:58

## 2025-03-17 RX ADMIN — SODIUM CHLORIDE: 9 INJECTION, SOLUTION INTRAVENOUS at 13:26

## 2025-03-17 RX ADMIN — POTASSIUM CHLORIDE 10 MEQ: 7.46 INJECTION, SOLUTION INTRAVENOUS at 20:44

## 2025-03-17 RX ADMIN — POTASSIUM CHLORIDE 10 MEQ: 7.46 INJECTION, SOLUTION INTRAVENOUS at 11:06

## 2025-03-17 RX ADMIN — SODIUM CHLORIDE, POTASSIUM CHLORIDE, SODIUM LACTATE AND CALCIUM CHLORIDE: 600; 310; 30; 20 INJECTION, SOLUTION INTRAVENOUS at 07:09

## 2025-03-17 RX ADMIN — POTASSIUM CHLORIDE 10 MEQ: 7.46 INJECTION, SOLUTION INTRAVENOUS at 15:00

## 2025-03-17 RX ADMIN — LIDOCAINE HYDROCHLORIDE 100 MG: 20 INJECTION, SOLUTION EPIDURAL; INFILTRATION; INTRACAUDAL; PERINEURAL at 13:33

## 2025-03-17 RX ADMIN — BUSPIRONE HYDROCHLORIDE 5 MG: 5 TABLET ORAL at 20:49

## 2025-03-17 RX ADMIN — POTASSIUM CHLORIDE 10 MEQ: 7.46 INJECTION, SOLUTION INTRAVENOUS at 09:38

## 2025-03-17 RX ADMIN — LEVETIRACETAM 1000 MG: 100 INJECTION INTRAVENOUS at 20:56

## 2025-03-17 RX ADMIN — METRONIDAZOLE 500 MG: 500 INJECTION, SOLUTION INTRAVENOUS at 22:50

## 2025-03-17 RX ADMIN — POTASSIUM CHLORIDE 10 MEQ: 7.46 INJECTION, SOLUTION INTRAVENOUS at 18:32

## 2025-03-17 RX ADMIN — DOCUSATE SODIUM 100 MG: 100 CAPSULE, LIQUID FILLED ORAL at 20:49

## 2025-03-17 RX ADMIN — POTASSIUM CHLORIDE 10 MEQ: 7.46 INJECTION, SOLUTION INTRAVENOUS at 16:49

## 2025-03-17 RX ADMIN — SODIUM CHLORIDE, POTASSIUM CHLORIDE, SODIUM LACTATE AND CALCIUM CHLORIDE: 600; 310; 30; 20 INJECTION, SOLUTION INTRAVENOUS at 01:19

## 2025-03-17 RX ADMIN — GABAPENTIN 100 MG: 100 CAPSULE ORAL at 20:48

## 2025-03-17 RX ADMIN — LEVETIRACETAM 1000 MG: 100 INJECTION INTRAVENOUS at 09:29

## 2025-03-17 RX ADMIN — PROPOFOL 100 MG: 10 INJECTION, EMULSION INTRAVENOUS at 13:33

## 2025-03-17 RX ADMIN — METRONIDAZOLE 500 MG: 500 INJECTION, SOLUTION INTRAVENOUS at 06:08

## 2025-03-17 RX ADMIN — SODIUM CHLORIDE, PRESERVATIVE FREE 40 MG: 5 INJECTION INTRAVENOUS at 09:28

## 2025-03-17 RX ADMIN — PROPOFOL 180 MCG/KG/MIN: 10 INJECTION, EMULSION INTRAVENOUS at 13:34

## 2025-03-17 RX ADMIN — WATER 1000 MG: 1 INJECTION INTRAMUSCULAR; INTRAVENOUS; SUBCUTANEOUS at 16:45

## 2025-03-17 RX ADMIN — SODIUM CHLORIDE, PRESERVATIVE FREE 40 MG: 5 INJECTION INTRAVENOUS at 20:54

## 2025-03-17 RX ADMIN — ONDANSETRON 4 MG: 2 INJECTION, SOLUTION INTRAMUSCULAR; INTRAVENOUS at 09:29

## 2025-03-17 ASSESSMENT — ENCOUNTER SYMPTOMS: SHORTNESS OF BREATH: 0

## 2025-03-17 ASSESSMENT — PAIN - FUNCTIONAL ASSESSMENT
PAIN_FUNCTIONAL_ASSESSMENT: 0-10
PAIN_FUNCTIONAL_ASSESSMENT: NONE - DENIES PAIN

## 2025-03-17 ASSESSMENT — PAIN SCALES - GENERAL
PAINLEVEL_OUTOF10: 0
PAINLEVEL_OUTOF10: 0

## 2025-03-17 NOTE — PROGRESS NOTES
[facility] - Physical Therapy   Phone: [phone]    Physical Therapy    [x] Initial Evaluation            [] Daily Treatment Note         [] Discharge Summary      Patient: Lydia Harris   : 1970   MRN: [id number]   Date of Service:  3/17/2025  Staff Mobility Recommendation: SBA with white cane    AM-PAC score:   Discharge Recommendations: Return to [facility]    Admitting Diagnosis: Intractable nausea and vomiting  Ordering Physician: Brooks Dixon MD   Current Admission Summary: Pt is a 54 y.o. female who presented to the ED on 3/14/25 with n/v. Awaiting EGD.  Past Medical History:  has a past medical history of Brain tumor (HCC), Depression, GERD (gastroesophageal reflux disease), Gestational diabetes, Heavy periods, Hypertension, Legally blind, Migraines, Seizures (HCC), Urinary incontinence, and Visual impairment.  Past Surgical History:  has a past surgical history that includes Cholecystectomy; brain surgery; Tubal ligation ();  section; Tonsillectomy; Tympanoplasty (Bilateral); Upper gastrointestinal endoscopy; and Colonoscopy.    Assessment  Activity Tolerance: Good  Impairments Requiring Therapeutic Intervention: decreased functional mobility  Prognosis: good    Clinical Assessment: Prior to admission, pt living at [facility], independent with ADLs and ambulatory with white cane. Pt currently functioning near baseline. Anticipate return to [facility].      DME Required For Discharge: no DME required at discharge    _________________________________________________________________________________________________________________________________________________________________________  Restrictions:  Precautions/Restrictions: medium fall risk, legally blind  Weight Bearing Restrictions: no restrictions    Required Braces/Orthotics: no braces required  Positional Restrictions:no positional

## 2025-03-17 NOTE — PLAN OF CARE
Problem: Safety - Adult  Goal: Free from fall injury  3/17/2025 0005 by Megan Al RN  Outcome: Progressing  3/16/2025 1615 by Trisha Aaron RN  Outcome: Progressing     Problem: Pain  Goal: Verbalizes/displays adequate comfort level or baseline comfort level  3/17/2025 0005 by Megan Al RN  Outcome: Progressing  3/16/2025 1615 by Trisha Aaron RN  Outcome: Progressing     Problem: Neurosensory - Adult  Goal: Achieves stable or improved neurological status  3/16/2025 1615 by Trisha Aaron RN  Outcome: Progressing

## 2025-03-17 NOTE — CARE COORDINATION
Case Management Assessment  Initial Evaluation    Date/Time of Evaluation: 3/17/2025 11:09 AM  Assessment Completed by: SHAKIR Rey    If patient is discharged prior to next notation, then this note serves as note for discharge by case management.    Patient Name: Lydia Harris                   YOB: 1970  Diagnosis: Generalized abdominal pain [R10.84]  Intractable nausea and vomiting [R11.2]  Bilious vomiting with nausea [R11.14]                   Date / Time: 3/14/2025 11:43 AM    Patient Admission Status: Inpatient   Readmission Risk (Low < 19, Mod (19-27), High > 27): Readmission Risk Score: 12    Current PCP: No primary care provider on file.  PCP verified by CM? (P) Yes    Chart Reviewed: Yes      History Provided by: (P) Patient  Patient Orientation: (P) Alert and Oriented, Person, Place    Patient Cognition: (P) Alert    Hospitalization in the last 30 days (Readmission):  No    If yes, Readmission Assessment in  Navigator will be completed.    Advance Directives:      Code Status: Full Code   Patient's Primary Decision Maker is: (P) Legal Next of Kin    Primary Decision Maker: Krishna Burr - Child - [phone]    Secondary Decision Maker: Cory Styles - Child - [phone]    Supplemental (Other) Decision Maker: Liza Quesada - Friend - [phone]    Discharge Planning:    Patient lives with: (P) Alone Type of Home: (P) Assisted living ([facility])  Primary Care Giver: (P) Self  Patient Support Systems include: (P) Family Members   Current Financial resources: (P) None  Current community resources: (P) None  Current services prior to admission: (P) Durable Medical Equipment            Current DME: (P) Walker            Type of Home Care services:  (P) None    ADLS  Prior functional level: (P) Assistance with the following:, Mobility, Independent in ADLs/IADLs  Current functional level: (P) Assistance with the following:, Mobility, Independent in ADLs/IADLs    PT

## 2025-03-17 NOTE — ANESTHESIA PRE PROCEDURE
03/17/2025 08:23 AM    CREATININE 0.8 03/17/2025 08:23 AM    GFRAA >60 02/07/2014 06:55 PM    AGRATIO 1.5 03/16/2025 05:10 AM    LABGLOM 88 03/17/2025 08:23 AM    LABGLOM >60 06/20/2023 10:20 AM    GLUCOSE 111 03/17/2025 08:23 AM    CALCIUM 8.8 03/17/2025 08:23 AM    BILITOT 0.7 03/16/2025 05:10 AM    ALKPHOS 87 03/16/2025 05:10 AM    AST 18 03/16/2025 05:10 AM    ALT 12 03/16/2025 05:10 AM       POC Tests: No results for input(s): \"POCGLU\", \"POCNA\", \"POCK\", \"POCCL\", \"POCBUN\", \"POCHEMO\", \"POCHCT\" in the last 72 hours.    Coags: No results found for: \"PROTIME\", \"INR\", \"APTT\"    HCG (If Applicable):   Lab Results   Component Value Date    PREGTESTUR Negative 05/07/2018    PREGSERUM POSITIVE 03/14/2025    HCGQUANT <5.0 03/14/2025        ABGs: No results found for: \"PHART\", \"PO2ART\", \"SDL0DDP\", \"WRI7QYP\", \"BEART\", \"K7IKKOGM\"     Type & Screen (If Applicable):  No results found for: \"ABORH\", \"LABANTI\"    Drug/Infectious Status (If Applicable):  No results found for: \"HIV\", \"HEPCAB\"    COVID-19 Screening (If Applicable): No results found for: \"COVID19\"        Anesthesia Evaluation  Patient summary reviewed and Nursing notes reviewed   no history of anesthetic complications:   Airway: Mallampati: II          Dental:      Comment: Missing all upper teeth    Pulmonary:normal exam        (-) pneumonia, COPD, shortness of breath and recent URI                           Cardiovascular:    (+) hypertension:    (-) pacemaker, past MI, CABG/stent and dysrhythmias                Neuro/Psych:   (+) headaches:, psychiatric history:   (-) seizures, neuromuscular disease and TIA           GI/Hepatic/Renal:   (+) GERD:, morbid obesity     (-) PUD, hepatitis and liver disease       Endo/Other:    (+) Diabetes.    (-) hypothyroidism, arthritis               Abdominal:             Vascular: negative vascular ROS.         Other Findings:             Anesthesia Plan      MAC     ASA 3           MIPS: Prophylactic antiemetics

## 2025-03-17 NOTE — ANESTHESIA POSTPROCEDURE EVALUATION
[facility] Department of Anesthesiology  Post-Anesthesia Note       Name:  Lydia Harris                                  Age:  54 y.o.  MRN:  [id number]     Last Vitals & Oxygen Saturation: BP (!) 140/94   Pulse 67   Temp 98.4 °F (36.9 °C) (Temporal)   Resp 19   Ht 1.499 m (4' 11\")   Wt 90.9 kg (200 lb 6.4 oz)   LMP 12/15/2017   SpO2 98%   BMI 40.48 kg/m²   Patient Vitals for the past 4 hrs:   BP Temp Temp src Pulse Resp SpO2   03/17/25 1410 (!) 140/94 -- -- 67 19 98 %   03/17/25 1405 134/79 -- -- 72 21 98 %   03/17/25 1400 138/82 -- -- 79 15 98 %   03/17/25 1355 133/61 -- -- 81 21 96 %   03/17/25 1353 133/61 -- -- 79 24 97 %   03/17/25 1350 127/82 -- -- 90 20 96 %   03/17/25 1345 (!) 110/92 -- -- (!) 102 14 97 %   03/17/25 1344 (!) 110/92 98.4 °F (36.9 °C) Temporal 96 12 95 %   03/17/25 1237 129/77 98 °F (36.7 °C) Temporal 75 18 95 %       Level of consciousness:  Awake, alert to baseline    Respiratory: Respirations easy, no distress. Stable.    Cardiovascular: Hemodynamically stable.    Hydration: Adequate.    PONV: Adequately managed.    Post-op pain: Adequately controlled.    Post-op assessment: Tolerated anesthetic well without complication.    Complications:  None.    Kevin Horan MD  March 17, 2025   2:46 PM

## 2025-03-17 NOTE — PROGRESS NOTES
Report called to trevor Zhong on [address]. Vitals stable. Denies needs. Will transport back to unit.

## 2025-03-17 NOTE — OP NOTE
Esophagogastroduodenoscopy Note    Patient:   Lydia Harris    :    1970    Facility:   [facility] [Inpatient]   Referring/PCP: No primary care provider on file.    Procedure:   Esophagogastroduodenoscopy   Date:     3/17/2025   Endoscopist:  Rafael Fitzgerald MD     Preoperative Diagnosis:    55-year-old female admitted evaluated with intractable nausea and vomiting.  Presents for an upper endoscopy to rule out the gastric outlet obstruction.    Postoperative Diagnosis:  1.  Normal mucosa in the esophagus  2.  Normal gastroesophageal junction  3.  Mild erythema of the mucosa in the antrum.  Biopsies obtained to rule out H. pylori infection  4.  Normal mucosa in the duodenal bulb and descending duodenum.    Anesthesia: MAC    Estimated blood loss: Minimal    Complications: None    Specimen: Biopsy of gastric mucosa    Instrument:     Description of Procedure:  Informed consent was obtained from the patient after explanation of the procedure including indications, description of the procedure,  benefits and possible risks and complications of the procedure, and alternatives. Questions were answered.  The patient's history was reviewed and a directed physical examination was performed prior to the procedure.    Patient was monitored throughout the procedure with pulse oximetry and periodic assessment of vital signs. Patient was sedated as noted above. With the patient in the left lateral decubitus position, the Olympus videoendoscope was placed in the patient's mouth and under direct visualization passed into the esophagus.  Visualization of the esophagus, stomach, and duodenum was performed during both introduction and withdrawal of the endoscope and retroflexed view of the proximal stomach was obtained. The scope was passed to the 2nd portion of the duodenum.  The patient tolerated the procedure well and was taken to the recovery area in good  condition.    Findings: The mucosa in esophagus is normal.  The gastroesophageal junction is normal.  There are no changes of reflux esophagitis, Acosta's esophagus, or eosinophilic esophagitis.  There is no evidence of an esophageal stricture.  The mucosa in the antrum of the stomach is mildly erythematous.  There is no evidence of ulcer disease.  Biopsies were obtained to rule out H. pylori infection.  The pylorus is normal.  The mucosa in the duodenal bulb and descending duodenum is normal.  There is no evidence of duodenal ulcers.      Recommendations:  1.  Continue symptomatic treatment  2.  Check biopsy results  3.  Start diet advance as tolerated  4.  No further GI evaluation planned at this point.  5.  Elevated lipase on admission however no pancreatitis on imaging.    Rafael Fitzgerald MD, MD

## 2025-03-17 NOTE — PROGRESS NOTES
V2.0    [facility] Progress Note      Name:  Lydia Harris /Age/Sex: 1970  (54 y.o. female)   MRN & CSN:  [id number] & [id number] Encounter Date/Time: 3/17/2025 8:33 AM EDT   Location:  [id number] PCP: No primary care provider on file.     Attending:Brooks Dixon MD       Hospital Day: 4    Assessment and Recommendations   Lydia Harris is a 54 y.o. female who presents with Intractable nausea and vomiting      Plan:   Intractable nausea and vomiting, CT scan nonspecific lipase elevated CT scan did not show pancreatitis GI consulted plan for EGD today  Severe hypokalemia admitted to hospital, aggressive replacing potassium, potassium 2.9 this morning continue IV supplement  Hypercalcemia calcium 11.1 on admission improved down to 8.8  Leukocytosis white count 17.5 on admission likely reactive, improved.  Acute kidney injury hypovolemic due to dehydration creatinine improved down to 1.0 yesterday BMP today  Possible UTI started on IV antibiotics started on admission  Seizure disorder continue home medication  Legally blind supportive care        Diet Diet NPO   DVT Prophylaxis [] Lovenox, []  Heparin, [] SCDs, [] Ambulation,  [] Eliquis, [] Xarelto  [] Coumadin   Code Status Full Code             Personally reviewed Lab Studies and Imaging     Discussed management of the case with GI who recommended endoscopy    Rhythm strip independently interpreted by myself heart rate 94 QT 0 46 QRS 01        Medical Decision Making:  The following items were considered in medical decision making:  Discussion of patient care with other providers  Reviewed clinical lab tests  Reviewed radiology tests  Reviewed other diagnostic tests/interventions  Independent review of radiologic images  Microbiology cultures and other micro tests reviewed      Subjective:     Chief Complaint: Nausea vomiting    Lydia Harris is a 54 y.o. female who presents with nausea vomiting weakness had episode of vomiting

## 2025-03-17 NOTE — PROGRESS NOTES
Patient admitted to PACU # 8 from [facility] at 1344 post ESOPHAGOGASTRODUODENOSCOPY BIOPSY  per Dr Fitzgerald.  Attached to PACU monitoring system and report received from anesthesia provider.  Patient was reported to be hemodynamically stable during procedure.  Patient drowsy on admission and denied pain.

## 2025-03-17 NOTE — PROGRESS NOTES
Critical potassium of 2.9.  Dr notified and replacement started.  Pt can't tolerate full rate so it is running at 60 ml/hr.

## 2025-03-17 NOTE — PROGRESS NOTES
Pt awake for most of the night, had multiple emesis & small bowel movements and was able to pivot out of the bed to the commode without issue. Pt signed procedure consent form, voided, complied with chg wipes and linen change. Left PIV patent with LR running 150 ml/hr. Pt remained NPO during the night. ID band on left wrist. Fall risk & allergy bracelet added to right wrist.   Electronically signed by CARLY ADAMES RN on 3/17/2025 at 7:56 AM

## 2025-03-18 LAB
ALBUMIN SERPL-MCNC: 3.6 G/DL (ref 3.4–5)
ANION GAP SERPL CALCULATED.3IONS-SCNC: 14 MMOL/L (ref 3–16)
BUN SERPL-MCNC: 6 MG/DL (ref 7–20)
CALCIUM SERPL-MCNC: 8.2 MG/DL (ref 8.3–10.6)
CHLORIDE SERPL-SCNC: 102 MMOL/L (ref 99–110)
CO2 SERPL-SCNC: 24 MMOL/L (ref 21–32)
CREAT SERPL-MCNC: 0.6 MG/DL (ref 0.6–1.1)
GFR SERPLBLD CREATININE-BSD FMLA CKD-EPI: >90 ML/MIN/{1.73_M2}
GLUCOSE SERPL-MCNC: 86 MG/DL (ref 70–99)
MAGNESIUM SERPL-MCNC: 0.87 MG/DL (ref 1.8–2.4)
MAGNESIUM SERPL-MCNC: 2.03 MG/DL (ref 1.8–2.4)
PHOSPHATE SERPL-MCNC: 1.8 MG/DL (ref 2.5–4.9)
PHOSPHATE SERPL-MCNC: 1.9 MG/DL (ref 2.5–4.9)
POTASSIUM SERPL-SCNC: 3 MMOL/L (ref 3.5–5.1)
POTASSIUM SERPL-SCNC: 4.2 MMOL/L (ref 3.5–5.1)
SODIUM SERPL-SCNC: 140 MMOL/L (ref 136–145)

## 2025-03-18 PROCEDURE — 84100 ASSAY OF PHOSPHORUS: CPT

## 2025-03-18 PROCEDURE — 94640 AIRWAY INHALATION TREATMENT: CPT

## 2025-03-18 PROCEDURE — 97530 THERAPEUTIC ACTIVITIES: CPT

## 2025-03-18 PROCEDURE — 6360000002 HC RX W HCPCS: Performed by: INTERNAL MEDICINE

## 2025-03-18 PROCEDURE — 6370000000 HC RX 637 (ALT 250 FOR IP): Performed by: INTERNAL MEDICINE

## 2025-03-18 PROCEDURE — 84132 ASSAY OF SERUM POTASSIUM: CPT

## 2025-03-18 PROCEDURE — 1200000000 HC SEMI PRIVATE

## 2025-03-18 PROCEDURE — 6360000002 HC RX W HCPCS: Performed by: HOSPITALIST

## 2025-03-18 PROCEDURE — 83735 ASSAY OF MAGNESIUM: CPT

## 2025-03-18 PROCEDURE — 80069 RENAL FUNCTION PANEL: CPT

## 2025-03-18 PROCEDURE — 94761 N-INVAS EAR/PLS OXIMETRY MLT: CPT

## 2025-03-18 PROCEDURE — 2500000003 HC RX 250 WO HCPCS: Performed by: INTERNAL MEDICINE

## 2025-03-18 PROCEDURE — 2580000003 HC RX 258: Performed by: INTERNAL MEDICINE

## 2025-03-18 PROCEDURE — 97110 THERAPEUTIC EXERCISES: CPT

## 2025-03-18 PROCEDURE — 2500000003 HC RX 250 WO HCPCS

## 2025-03-18 PROCEDURE — 97535 SELF CARE MNGMENT TRAINING: CPT

## 2025-03-18 PROCEDURE — 36415 COLL VENOUS BLD VENIPUNCTURE: CPT

## 2025-03-18 RX ORDER — MAGNESIUM SULFATE IN WATER 40 MG/ML
2000 INJECTION, SOLUTION INTRAVENOUS PRN
Status: DISCONTINUED | OUTPATIENT
Start: 2025-03-18 | End: 2025-03-21 | Stop reason: HOSPADM

## 2025-03-18 RX ORDER — POTASSIUM CHLORIDE 7.45 MG/ML
10 INJECTION INTRAVENOUS PRN
Status: DISCONTINUED | OUTPATIENT
Start: 2025-03-18 | End: 2025-03-18

## 2025-03-18 RX ORDER — WATER 10 ML/10ML
INJECTION INTRAMUSCULAR; INTRAVENOUS; SUBCUTANEOUS
Status: COMPLETED
Start: 2025-03-18 | End: 2025-03-18

## 2025-03-18 RX ORDER — POTASSIUM CHLORIDE 1500 MG/1
40 TABLET, EXTENDED RELEASE ORAL PRN
Status: DISCONTINUED | OUTPATIENT
Start: 2025-03-18 | End: 2025-03-18

## 2025-03-18 RX ADMIN — LEVETIRACETAM 1000 MG: 100 INJECTION INTRAVENOUS at 08:33

## 2025-03-18 RX ADMIN — ONDANSETRON 4 MG: 2 INJECTION, SOLUTION INTRAMUSCULAR; INTRAVENOUS at 21:37

## 2025-03-18 RX ADMIN — ENOXAPARIN SODIUM 40 MG: 100 INJECTION SUBCUTANEOUS at 08:33

## 2025-03-18 RX ADMIN — METRONIDAZOLE 500 MG: 500 INJECTION, SOLUTION INTRAVENOUS at 21:33

## 2025-03-18 RX ADMIN — DOCUSATE SODIUM 100 MG: 100 CAPSULE, LIQUID FILLED ORAL at 21:18

## 2025-03-18 RX ADMIN — POTASSIUM CHLORIDE 10 MEQ: 7.46 INJECTION, SOLUTION INTRAVENOUS at 11:58

## 2025-03-18 RX ADMIN — MAGNESIUM SULFATE HEPTAHYDRATE 2000 MG: 40 INJECTION, SOLUTION INTRAVENOUS at 11:13

## 2025-03-18 RX ADMIN — SODIUM CHLORIDE, PRESERVATIVE FREE 10 ML: 5 INJECTION INTRAVENOUS at 09:03

## 2025-03-18 RX ADMIN — METRONIDAZOLE 500 MG: 500 INJECTION, SOLUTION INTRAVENOUS at 05:58

## 2025-03-18 RX ADMIN — WATER 1000 MG: 1 INJECTION INTRAMUSCULAR; INTRAVENOUS; SUBCUTANEOUS at 15:30

## 2025-03-18 RX ADMIN — MAGNESIUM SULFATE HEPTAHYDRATE 2000 MG: 40 INJECTION, SOLUTION INTRAVENOUS at 09:00

## 2025-03-18 RX ADMIN — METRONIDAZOLE 500 MG: 500 INJECTION, SOLUTION INTRAVENOUS at 13:33

## 2025-03-18 RX ADMIN — GABAPENTIN 100 MG: 100 CAPSULE ORAL at 21:18

## 2025-03-18 RX ADMIN — BUSPIRONE HYDROCHLORIDE 5 MG: 5 TABLET ORAL at 21:18

## 2025-03-18 RX ADMIN — POTASSIUM CHLORIDE 10 MEQ: 7.46 INJECTION, SOLUTION INTRAVENOUS at 08:55

## 2025-03-18 RX ADMIN — POTASSIUM CHLORIDE 10 MEQ: 7.46 INJECTION, SOLUTION INTRAVENOUS at 16:59

## 2025-03-18 RX ADMIN — SODIUM CHLORIDE, PRESERVATIVE FREE 40 MG: 5 INJECTION INTRAVENOUS at 09:03

## 2025-03-18 RX ADMIN — ATORVASTATIN CALCIUM 80 MG: 80 TABLET, FILM COATED ORAL at 21:18

## 2025-03-18 RX ADMIN — WATER 10 ML: 1 INJECTION INTRAMUSCULAR; INTRAVENOUS; SUBCUTANEOUS at 22:54

## 2025-03-18 RX ADMIN — LEVETIRACETAM 1000 MG: 100 INJECTION INTRAVENOUS at 21:19

## 2025-03-18 RX ADMIN — BUDESONIDE AND FORMOTEROL FUMARATE DIHYDRATE 2 PUFF: 80; 4.5 AEROSOL RESPIRATORY (INHALATION) at 12:53

## 2025-03-18 RX ADMIN — POTASSIUM CHLORIDE 10 MEQ: 7.46 INJECTION, SOLUTION INTRAVENOUS at 13:35

## 2025-03-18 RX ADMIN — POTASSIUM CHLORIDE 10 MEQ: 7.46 INJECTION, SOLUTION INTRAVENOUS at 10:40

## 2025-03-18 RX ADMIN — POTASSIUM PHOSPHATE, MONOBASIC POTASSIUM PHOSPHATE, DIBASIC 20 MMOL: 224; 236 INJECTION, SOLUTION, CONCENTRATE INTRAVENOUS at 18:41

## 2025-03-18 RX ADMIN — FAMOTIDINE 20 MG: 20 TABLET, FILM COATED ORAL at 06:28

## 2025-03-18 RX ADMIN — POTASSIUM PHOSPHATE, MONOBASIC POTASSIUM PHOSPHATE, DIBASIC 15 MMOL: 224; 236 INJECTION, SOLUTION, CONCENTRATE INTRAVENOUS at 09:17

## 2025-03-18 RX ADMIN — SODIUM CHLORIDE, PRESERVATIVE FREE 40 MG: 5 INJECTION INTRAVENOUS at 21:18

## 2025-03-18 RX ADMIN — POTASSIUM CHLORIDE 10 MEQ: 7.46 INJECTION, SOLUTION INTRAVENOUS at 15:24

## 2025-03-18 NOTE — PROGRESS NOTES
[facility] - Physical Therapy   Phone: [phone]    Physical Therapy    [] Initial Evaluation            [x] Daily Treatment Note         [] Discharge Summary      Patient: Lydia Harris   : 1970   MRN: [id number]   Date of Service:  3/18/2025  Staff Mobility Recommendation: SBA with white cane    AM-PAC score:   Discharge Recommendations: Return to [facility]    Admitting Diagnosis: Intractable nausea and vomiting  Ordering Physician: Brooks Dixon MD   Current Admission Summary: Pt is a 54 y.o. female who presented to the ED on 3/14/25 with n/v. Awaiting EGD.  Past Medical History:  has a past medical history of Brain tumor (HCC), Depression, GERD (gastroesophageal reflux disease), Gestational diabetes, Heavy periods, Hypertension, Legally blind, Migraines, Seizures (HCC), Urinary incontinence, and Visual impairment.  Past Surgical History:  has a past surgical history that includes Cholecystectomy; brain surgery; Tubal ligation ();  section; Tonsillectomy; Tympanoplasty (Bilateral); Upper gastrointestinal endoscopy; Colonoscopy; and Upper gastrointestinal endoscopy (N/A, 3/17/2025).    Assessment  Activity Tolerance: Good  Impairments Requiring Therapeutic Intervention: decreased functional mobility  Prognosis: good    Clinical Assessment: Prior to admission, pt living at [facility] assisted [facility] facility, independent with ADLs and ambulatory with white cane. Pt currently functioning near baseline. Able to progress amb short distance + participate in seated exercise. Limited by nausea/vomiting and loose BM on the BSC. Anticipate return to [facility].    DME Required For Discharge: no DME required at discharge    _________________________________________________________________________________________________________________________________________________________________________  Restrictions:  Precautions/Restrictions: medium fall

## 2025-03-18 NOTE — PROGRESS NOTES
Progress Note    Patient Lydia Harris  MRN: [id number]  YOB: 1970 Age: 54 y.o. Sex: female  Room: [place] [address]       Admitting Physician: Brooks Dixon MD   Date of Admission: 3/14/2025 11:43 AM   Primary Care Physician: No primary care provider on file.     Subjective:  Lydia Harris was seen and examined. We are following for intractable nausea and vomiting.  -- Patient stated that she is feeling better.  She has minimal nausea and gagging however overall she is feeling significantly better.  She had no emesis.  She denies abdominal pain.      Objective:  Vital Signs:   Vitals:    03/18/25 0758   BP: (!) 150/90   Pulse: 78   Resp: 16   Temp: 98.4 °F (36.9 °C)   SpO2: 97%         Physical Exam:  Constitutional: Alert and oriented x 4. No acute distress.   Intake/Output:    Intake/Output Summary (Last 24 hours) at 3/18/2025 1251  Last data filed at 3/17/2025 2054  Gross per 24 hour   Intake 1180 ml   Output --   Net 1180 ml        Current Medications:  Current Facility-Administered Medications   Medication Dose Route Frequency Provider Last Rate Last Admin    magnesium sulfate 2000 mg in 50 mL IVPB premix  2,000 mg IntraVENous PRN Sofya Brooke MD 25 mL/hr at 03/18/25 1113 2,000 mg at 03/18/25 1113    potassium phosphate 15 mmol in sodium chloride 0.9 % 250 mL IVPB  15 mmol IntraVENous Once Brooks Dixon MD 62.5 mL/hr at 03/18/25 0917 15 mmol at 03/18/25 0917    naloxone 0.4 mg in 10 mL sodium chloride syringe   IntraVENous PRN Antoni Blakely MD        sodium chloride flush 0.9 % injection 5-40 mL  5-40 mL IntraVENous 2 times per day Antoni Blakely MD        sodium chloride flush 0.9 % injection 5-40 mL  5-40 mL IntraVENous PRN Antoni Blakely MD        0.9 % sodium chloride infusion   IntraVENous PRN Antoni Blakely MD        potassium chloride (KLOR-CON M) extended release tablet 40 mEq  40 mEq Oral PRN Bell Salomon Jr., MD        Or  voice recognition software. If there are any questions about the content of this document, please contact the author as some errors in translation may have occurred.

## 2025-03-18 NOTE — PROGRESS NOTES
V2.0    [facility] Progress Note      Name:  Lydia Harris /Age/Sex: 1970  (54 y.o. female)   MRN & CSN:  [id number] & [id number] Encounter Date/Time: 3/18/2025 8:31 AM EDT   Location:  [id number] PCP: No primary care provider on file.     Attending:Brooks Dixon MD       Hospital Day: 5    Assessment and Recommendations   Lydia Harris is a 54 y.o. female who presents with Intractable nausea and vomiting      Plan:   Intractable nausea and vomiting, CT scan nonspecific lipase elevated CT scan did not show pancreatitis GI consulted, status post EGD.  No significant finding  Severe hypokalemia admitted to hospital, aggressive replacing potassium, potassium 3.0 today  Hypercalcemia calcium 11.1 on admission improved down to 8.8  Leukocytosis white count 17.5 on admission likely reactive, improved.  Acute kidney injury hypovolemic due to dehydration creatinine improved down to 1.0 yesterday BMP today  Possible UTI started on IV antibiotics started on admission  Seizure disorder continue home medication  Hypophosphatemia will replace phosphorus 1.9  Legally blind supportive care        Diet ADULT DIET; Clear Liquid   DVT Prophylaxis [] Lovenox, []  Heparin, [] SCDs, [] Ambulation,  [] Eliquis, [] Xarelto  [] Coumadin   Code Status Full Code             Personally reviewed Lab Studies and Imaging     Discussed management of the case with GI who recommended supportive measures    Rhythm strip independently interpreted by myself heart rate 73 QRS 0.08 QT 06        Medical Decision Making:  The following items were considered in medical decision making:  Discussion of patient care with other providers  Reviewed clinical lab tests  Reviewed radiology tests  Reviewed other diagnostic tests/interventions  Independent review of radiologic images  Microbiology cultures and other micro tests reviewed      Subjective:     Chief Complaint: Nausea vomiting    Lydia Harris is a 54 y.o. female who

## 2025-03-18 NOTE — CARE COORDINATION
Chart review done, rounds completed.   No plans for dc, still nausea and vomiting, will follow. Might need HC, will send referral, check to see if [facility] is the contracted HC, or if can use another agency    Jonathan Hernandez LMSW, [facility] Social Work Case Management   Phone: [phone]  Fax: [phone]

## 2025-03-18 NOTE — PROGRESS NOTES
Occupational Therapy  Facility/Department: [address] MED SURG  Occupational Therapy Daily Treatment Note      Name: Lydia Harris  : 1970  MRN: [id number]  Date of Service: 3/18/2025    Discharge Recommendations:  Home with Home health OT        Lydia Harris scored a 18/24 on the AM-PAC ADL Inpatient form. Current research shows that an AM-PAC score of 18 or greater is typically associated with a discharge to the patient's home setting. Based on the patient's AM-PAC score, and their current ADL deficits, it is recommended that the patient have 2-3 sessions per week of Occupational Therapy at d/c to increase the patient's independence.  At this time, this patient demonstrates the endurance and safety to discharge home with home therapy and a follow up treatment frequency of 2-3x/wk.   Please see assessment section for further patient specific details.    If patient discharges prior to next session this note will serve as a discharge summary.  Please see below for the latest assessment towards goals.      Patient Diagnosis(es): The primary encounter diagnosis was Bilious vomiting with nausea. Diagnoses of Generalized abdominal pain, Hypokalemia, JANIE (acute kidney injury), and Nausea and vomiting, unspecified vomiting type were also pertinent to this visit.  Past Medical History:  has a past medical history of Brain tumor (HCC), Depression, GERD (gastroesophageal reflux disease), Gestational diabetes, Heavy periods, Hypertension, Legally blind, Migraines, Seizures (HCC), Urinary incontinence, and Visual impairment.  Past Surgical History:  has a past surgical history that includes Cholecystectomy; brain surgery; Tubal ligation ();  section; Tonsillectomy; Tympanoplasty (Bilateral); Upper gastrointestinal endoscopy; Colonoscopy; and Upper gastrointestinal endoscopy (N/A, 3/17/2025).      Assessment  Performance deficits / Impairments: Decreased functional mobility ;Decreased ADL  status;Decreased strength;Decreased safe awareness;Decreased balance;Decreased fine motor control;Decreased endurance;Decreased vision/visual deficit  Assessment: 53 y/o female admitted 3/14/2025 with nausea/vomitting, JANIE, and C-diff r/o.  PTA pt lives in Assisted Living at [facility]. Pt reports she is ind with ADLs and use of white cane. Today, pt required SBA for bed mobility and sitting EOB. Pt used commode and transfer to the recliner.  Pt declined any further mobility or offer to go to the bathroom to use the toilet.  Pt is functioning below baseline and benefit from skilled therapy while in hospital. Pt states plan is to return to Assisted Living and would benefit from home OT.  Prognosis: Fair;Good  Activity Tolerance  Activity Tolerance: Patient limited by fatigue     Plan  Occupational Therapy Plan  Times Per Week: 3-5x/wk  Current Treatment Recommendations: Strengthening, Balance training, Functional mobility training, Endurance training, Gait training, Safety education & training, Stair training, Self-Care / ADL, Patient/Caregiver education & training    Restrictions  Restrictions/Precautions  Restrictions/Precautions: Isolation  Position Activity Restriction  Other Position/Activity Restrictions: iso: c-diff r/o;  legally blind    Subjective  General  Chart Reviewed: Yes, Progress Notes  Patient assessed for rehabilitation services?: Yes  Additional Pertinent Hx: 53 y/o female admitted 3/14/2025 with Intractable nausea and vomiting, hypokalemia, JANIE.  Family / Caregiver Present: No  Referring Practitioner: Dr. Dixon  Subjective  Subjective: Pt agreed to OT with minimal encouragement.  General Comment  Comments: Per RN ok for therapy     Social/Functional History  Social/Functional History  Type of Home: Facility ([facility]- Assisted Living)  Home Layout: One level  Home Access: Level entry  Bathroom Shower/Tub:  (Cut out tub shower)  Bathroom Toilet: Standard  Bathroom

## 2025-03-19 PROCEDURE — 94761 N-INVAS EAR/PLS OXIMETRY MLT: CPT

## 2025-03-19 PROCEDURE — 2500000003 HC RX 250 WO HCPCS: Performed by: INTERNAL MEDICINE

## 2025-03-19 PROCEDURE — 6370000000 HC RX 637 (ALT 250 FOR IP): Performed by: INTERNAL MEDICINE

## 2025-03-19 PROCEDURE — 97535 SELF CARE MNGMENT TRAINING: CPT

## 2025-03-19 PROCEDURE — 1200000000 HC SEMI PRIVATE

## 2025-03-19 PROCEDURE — 94640 AIRWAY INHALATION TREATMENT: CPT

## 2025-03-19 PROCEDURE — 2500000003 HC RX 250 WO HCPCS

## 2025-03-19 PROCEDURE — 6360000002 HC RX W HCPCS: Performed by: INTERNAL MEDICINE

## 2025-03-19 PROCEDURE — 2580000003 HC RX 258: Performed by: INTERNAL MEDICINE

## 2025-03-19 RX ORDER — WATER 10 ML/10ML
INJECTION INTRAMUSCULAR; INTRAVENOUS; SUBCUTANEOUS
Status: COMPLETED
Start: 2025-03-19 | End: 2025-03-19

## 2025-03-19 RX ADMIN — LEVETIRACETAM 1000 MG: 100 INJECTION INTRAVENOUS at 20:32

## 2025-03-19 RX ADMIN — SODIUM CHLORIDE, POTASSIUM CHLORIDE, SODIUM LACTATE AND CALCIUM CHLORIDE: 600; 310; 30; 20 INJECTION, SOLUTION INTRAVENOUS at 20:42

## 2025-03-19 RX ADMIN — FLUOXETINE HYDROCHLORIDE 40 MG: 20 CAPSULE ORAL at 09:00

## 2025-03-19 RX ADMIN — METOPROLOL SUCCINATE 50 MG: 50 TABLET, EXTENDED RELEASE ORAL at 09:00

## 2025-03-19 RX ADMIN — ENOXAPARIN SODIUM 40 MG: 100 INJECTION SUBCUTANEOUS at 09:01

## 2025-03-19 RX ADMIN — SODIUM CHLORIDE, POTASSIUM CHLORIDE, SODIUM LACTATE AND CALCIUM CHLORIDE: 600; 310; 30; 20 INJECTION, SOLUTION INTRAVENOUS at 04:08

## 2025-03-19 RX ADMIN — METRONIDAZOLE 500 MG: 500 INJECTION, SOLUTION INTRAVENOUS at 06:11

## 2025-03-19 RX ADMIN — DOCUSATE SODIUM 100 MG: 100 CAPSULE, LIQUID FILLED ORAL at 20:21

## 2025-03-19 RX ADMIN — BUDESONIDE AND FORMOTEROL FUMARATE DIHYDRATE 2 PUFF: 80; 4.5 AEROSOL RESPIRATORY (INHALATION) at 19:48

## 2025-03-19 RX ADMIN — Medication 2000 UNITS: at 09:00

## 2025-03-19 RX ADMIN — METRONIDAZOLE 500 MG: 500 INJECTION, SOLUTION INTRAVENOUS at 14:46

## 2025-03-19 RX ADMIN — FAMOTIDINE 20 MG: 20 TABLET, FILM COATED ORAL at 06:08

## 2025-03-19 RX ADMIN — BUSPIRONE HYDROCHLORIDE 5 MG: 5 TABLET ORAL at 09:00

## 2025-03-19 RX ADMIN — GABAPENTIN 100 MG: 100 CAPSULE ORAL at 20:24

## 2025-03-19 RX ADMIN — BUDESONIDE AND FORMOTEROL FUMARATE DIHYDRATE 2 PUFF: 80; 4.5 AEROSOL RESPIRATORY (INHALATION) at 12:41

## 2025-03-19 RX ADMIN — WATER 10 ML: 1 INJECTION INTRAMUSCULAR; INTRAVENOUS; SUBCUTANEOUS at 20:33

## 2025-03-19 RX ADMIN — LEVETIRACETAM 1000 MG: 100 INJECTION INTRAVENOUS at 09:00

## 2025-03-19 RX ADMIN — SODIUM CHLORIDE, PRESERVATIVE FREE 40 MG: 5 INJECTION INTRAVENOUS at 09:01

## 2025-03-19 RX ADMIN — OXYBUTYNIN CHLORIDE 10 MG: 10 TABLET, EXTENDED RELEASE ORAL at 09:00

## 2025-03-19 RX ADMIN — METRONIDAZOLE 500 MG: 500 INJECTION, SOLUTION INTRAVENOUS at 22:05

## 2025-03-19 RX ADMIN — SODIUM CHLORIDE, PRESERVATIVE FREE 40 MG: 5 INJECTION INTRAVENOUS at 20:19

## 2025-03-19 RX ADMIN — DOCUSATE SODIUM 100 MG: 100 CAPSULE, LIQUID FILLED ORAL at 09:00

## 2025-03-19 RX ADMIN — ISOSORBIDE MONONITRATE 30 MG: 60 TABLET, EXTENDED RELEASE ORAL at 09:00

## 2025-03-19 RX ADMIN — SODIUM CHLORIDE, POTASSIUM CHLORIDE, SODIUM LACTATE AND CALCIUM CHLORIDE: 600; 310; 30; 20 INJECTION, SOLUTION INTRAVENOUS at 14:46

## 2025-03-19 RX ADMIN — BUSPIRONE HYDROCHLORIDE 5 MG: 5 TABLET ORAL at 20:21

## 2025-03-19 RX ADMIN — ATORVASTATIN CALCIUM 80 MG: 80 TABLET, FILM COATED ORAL at 20:21

## 2025-03-19 ASSESSMENT — PAIN SCALES - GENERAL: PAINLEVEL_OUTOF10: 0

## 2025-03-19 NOTE — PROGRESS NOTES
Occupational Therapy  Facility/Department: [address] MED SURG  Occupational Therapy Daily Treatment Note    Name: Lydia Harris  : 1970  MRN: [id number]  Date of Service: 3/19/2025    Discharge Recommendations:  Home with Home health OT       Lydia Harris scored a 18/24 on the AM-PAC ADL Inpatient form. Current research shows that an AM-PAC score of 18 or greater is typically associated with a discharge to the patient's home setting.   Please see assessment section for further patient specific details.    If patient discharges prior to next session this note will serve as a discharge summary.  Please see below for the latest assessment towards goals.         Patient Diagnosis(es): The primary encounter diagnosis was Bilious vomiting with nausea. Diagnoses of Generalized abdominal pain, Hypokalemia, JANIE (acute kidney injury), and Nausea and vomiting, unspecified vomiting type were also pertinent to this visit.  Past Medical History:  has a past medical history of Brain tumor (HCC), Depression, GERD (gastroesophageal reflux disease), Gestational diabetes, Heavy periods, Hypertension, Legally blind, Migraines, Seizures (HCC), Urinary incontinence, and Visual impairment.  Past Surgical History:  has a past surgical history that includes Cholecystectomy; brain surgery; Tubal ligation ();  section; Tonsillectomy; Tympanoplasty (Bilateral); Upper gastrointestinal endoscopy; Colonoscopy; and Upper gastrointestinal endoscopy (N/A, 3/17/2025).           Assessment  Performance deficits / Impairments: Decreased functional mobility ;Decreased ADL status;Decreased strength;Decreased safe awareness;Decreased balance;Decreased fine motor control;Decreased endurance;Decreased vision/visual deficit  Assessment: 55 y/o female admitted 3/14/2025 with nausea/vomitting, JANIE, and C-diff r/o.  PTA pt lives in Assisted Living at [facility]. Pt reports she is ind with ADLs and use of white cane.  will be mod A for LB Dressing/bathing and toileting, SBA for UB bathing/dressing and grooming when seated based on balance, endurance, and cognition observed  Product Used : Bath wipes        Bed mobility  Supine to Sit: Stand by assistance (HOB, used bed rail)  Scooting: Stand by assistance  Bed Mobility Comments: pt seated in chair at EOS  Transfers  Sit to stand: Contact guard assistance  Stand to sit: Contact guard assistance  Transfer Comments: to/from standing w/cane     Cognition  Following Commands: Follows all commands without difficulty  Attention Span: Attends with cues to redirect  Safety Judgement: Decreased awareness of need for safety;Decreased awareness of need for assistance  Insights: Decreased awareness of deficits  Orientation  Overall Orientation Status: Within Functional Limits                  Education Given To: Patient  Education Provided: Transfer Training;Role of Therapy;Plan of Care;ADL Adaptive Strategies;Energy Conservation;Mobility Training  Education Method: Demonstration;Verbal  Barriers to Learning: Vision  Education Outcome: Verbalized understanding;Demonstrated understanding          AM-PAC - ADL  AM-PAC Daily Activity - Inpatient   How much help is needed for putting on and taking off regular lower body clothing?: A Little  How much help is needed for bathing (which includes washing, rinsing, drying)?: A Little  How much help is needed for toileting (which includes using toilet, bedpan, or urinal)?: A Little  How much help is needed for putting on and taking off regular upper body clothing?: A Little  How much help is needed for taking care of personal grooming?: A Little  How much help for eating meals?: A Little  AM-PAC Inpatient Daily Activity Raw Score: 18  AM-PAC Inpatient ADL T-Scale Score : 38.66  ADL Inpatient CMS 0-100% Score: 46.65  ADL Inpatient CMS G-Code Modifier : CK    Goals  Short Term Goals  Time Frame for Short Term Goals: Prior to DC:  Short Term Goal 1: Pt

## 2025-03-19 NOTE — PROGRESS NOTES
Progress Note    Patient Lydia Harris  MRN: [id number]  YOB: 1970 Age: 54 y.o. Sex: female  Room: [place] [address]       Admitting Physician: Brooks Dixon MD   Date of Admission: 3/14/2025 11:43 AM   Primary Care Physician: No primary care provider on file.     Subjective:  Lydia Harris was seen and examined. We are following for intractable nausea and vomiting. .  -- denies nausea or vomiting.  Tolerating diet.      Objective:  Vital Signs:   Vitals:    03/19/25 0828   BP: 135/77   Pulse: 71   Resp: 18   Temp: 98.2 °F (36.8 °C)   SpO2: 99%         Physical Exam: Alert, oriented.  Soft abdomen.    Intake/Output:    Intake/Output Summary (Last 24 hours) at 3/19/2025 1007  Last data filed at 3/18/2025 2200  Gross per 24 hour   Intake --   Output 750 ml   Net -750 ml        Current Medications:  Current Facility-Administered Medications   Medication Dose Route Frequency Provider Last Rate Last Admin    magnesium sulfate 2000 mg in 50 mL IVPB premix  2,000 mg IntraVENous PRN Sofya Brooke MD   Stopped at 03/18/25 1318    naloxone 0.4 mg in 10 mL sodium chloride syringe   IntraVENous PRN Antoni Blakely MD        sodium chloride flush 0.9 % injection 5-40 mL  5-40 mL IntraVENous 2 times per day Antoni Blakely MD        sodium chloride flush 0.9 % injection 5-40 mL  5-40 mL IntraVENous PRN Antoni Blakely MD        0.9 % sodium chloride infusion   IntraVENous PRN Antoni Blakely MD        potassium chloride (KLOR-CON M) extended release tablet 40 mEq  40 mEq Oral PRN Bell Salomon Jr., MD        Or    potassium bicarb-citric acid (EFFER-K) effervescent tablet 40 mEq  40 mEq Oral PRN Bell Salomon Jr., MD        Or    potassium chloride 10 mEq/100 mL IVPB (Peripheral Line)  10 mEq IntraVENous PRN Bell Salomon Jr., MD   Stopped at 03/18/25 2029    enoxaparin (LOVENOX) injection 40 mg  40 mg SubCUTAneous Daily Brooks Dixon MD   40 mg at

## 2025-03-19 NOTE — PROGRESS NOTES
V2.0    [facility] Progress Note      Name:  Lydia Harris /Age/Sex: 1970  (54 y.o. female)   MRN & CSN:  [id number] & [id number] Encounter Date/Time: 3/19/2025 9:55 AM EDT   Location:  [id number]-2059/4274-01 PCP: No primary care provider on file.     Attending:Brooks Dixon MD       Hospital Day: 6    Assessment and Recommendations   Lydia Harris is a 54 y.o. female who presents with Intractable nausea and vomiting      Plan:     Intractable nausea and vomiting, CT scan nonspecific lipase elevated CT scan did not show pancreatitis GI consulted, status post EGD.  No significant finding overall improving today less vomiting less nausea  Severe hypokalemia has been replacing aggressively  Hypercalcemia calcium 11.1 on admission improved down.  Leukocytosis white count 17.5 on admission likely reactive, improved.  Acute kidney injury hypovolemic due to dehydration creatinine improved.  Possible UTI started on IV antibiotics started on admission  Seizure disorder continue home medication  Hypophosphatemia replaced  Legally blind supportive care        Diet ADULT DIET; Clear Liquid   DVT Prophylaxis [] Lovenox, []  Heparin, [] SCDs, [] Ambulation,  [] Eliquis, [] Xarelto  [] Coumadin   Code Status Full Code             Personally reviewed Lab Studies and Imaging     Discussed management of the case with GI who recommended supportive measures  Rhythm strip independently interpreted by myself heart rate 64 QT 05 no ST elevation      Medical Decision Making:  The following items were considered in medical decision making:  Discussion of patient care with other providers  Reviewed clinical lab tests  Reviewed radiology tests  Reviewed other diagnostic tests/interventions  Independent review of radiologic images  Microbiology cultures and other micro tests reviewed      Subjective:     Chief Complaint: Nausea vomiting    Lydia Harris is a 54 y.o. female who presents with nausea vomiting improved overall  nurse at bedside      Review of Systems:      Pertinent positives and negatives discussed in HPI    Objective:     Intake/Output Summary (Last 24 hours) at 3/19/2025 0955  Last data filed at 3/18/2025 2200  Gross per 24 hour   Intake --   Output 750 ml   Net -750 ml      Vitals:   Vitals:    03/18/25 1949 03/18/25 2345 03/19/25 0354 03/19/25 0828   BP: 136/89 132/87 126/86 135/77   Pulse: 87 99 74 71   Resp: 16 18 16 18   Temp: 98.4 °F (36.9 °C) 98.2 °F (36.8 °C) 97.9 °F (36.6 °C) 98.2 °F (36.8 °C)   TempSrc: Oral Oral Oral Oral   SpO2: 98% 97% 99% 99%   Weight:   91 kg (200 lb 9.9 oz)    Height:   1.499 m (4' 11.02\")          Physical Exam:      Physical Exam Performed:    /77   Pulse 71   Temp 98.2 °F (36.8 °C) (Oral)   Resp 18   Ht 1.499 m (4' 11.02\")   Wt 91 kg (200 lb 9.9 oz)   LMP 12/15/2017   SpO2 99%   BMI 40.50 kg/m²     General appearance: No apparent distress  Respiratory:  Normal respiratory effort. Clear to auscultation, bilaterally without Rales/Wheezes/Rhonchi.  Cardiovascular: Regular rate and rhythm with normal S1/S2 without murmurs, rubs or gallops.  Abdomen: Soft, non-tender  Musculoskeletal: No clubbing, cyanosis   Neurologic: No focal weakness  Psychiatric: Alert and oriented  Capillary Refill: Brisk, 3 seconds, normal   Peripheral Pulses: +2 palpable, equal bilaterally       Medications:   Medications:    sodium chloride flush  5-40 mL IntraVENous 2 times per day    enoxaparin  40 mg SubCUTAneous Daily    potassium chloride  40 mEq Oral Once    scopolamine  1 patch TransDERmal Q72H    pantoprazole (PROTONIX) 40 mg in sodium chloride (PF) 0.9 % 10 mL injection  40 mg IntraVENous BID    levETIRAcetam  1,000 mg IntraVENous Q12H    dicyclomine  10 mg Oral Once    atorvastatin  80 mg Oral Nightly    budesonide-formoterol  2 puff Inhalation BID RT    busPIRone  5 mg Oral BID    Vitamin D  2,000 Units Oral Daily    docusate sodium  100 mg Oral BID    famotidine  20 mg Oral Daily

## 2025-03-19 NOTE — PLAN OF CARE
Problem: Chronic Conditions and Co-morbidities  Goal: Patient's chronic conditions and co-morbidity symptoms are monitored and maintained or improved  3/19/2025 0159 by Valdez Salazar RN  Outcome: Progressing  Flowsheets (Taken 3/18/2025 2000)  Care Plan - Patient's Chronic Conditions and Co-Morbidity Symptoms are Monitored and Maintained or Improved: Monitor and assess patient's chronic conditions and comorbid symptoms for stability, deterioration, or improvement     Problem: Discharge Planning  Goal: Discharge to home or other facility with appropriate resources  3/19/2025 0159 by Valdez Salazar RN  Outcome: Progressing  Flowsheets (Taken 3/18/2025 2000)  Discharge to home or other facility with appropriate resources: Identify barriers to discharge with patient and caregiver     Problem: Pain  Goal: Verbalizes/displays adequate comfort level or baseline comfort level  3/19/2025 0159 by Valdez Salazar RN  Outcome: Progressing     Problem: Safety - Adult  Goal: Free from fall injury  3/18/2025 1626 by Trisha Aaron RN  Outcome: Progressing     Problem: Neurosensory - Adult  Goal: Achieves stable or improved neurological status  3/19/2025 0159 by Valdez Salazar RN  Outcome: Progressing  Flowsheets (Taken 3/18/2025 2000)  Achieves stable or improved neurological status:   Assess for and report changes in neurological status   Maintain blood pressure and fluid volume within ordered parameters to optimize cerebral perfusion and minimize risk of hemorrhage     Problem: Neurosensory - Adult  Goal: Achieves maximal functionality and self care  3/19/2025 0159 by Valdez Salazar RN  Outcome: Progressing  Flowsheets (Taken 3/18/2025 2000)  Achieves maximal functionality and self care: Monitor swallowing and airway patency with patient fatigue and changes in neurological status     Problem: Skin/Tissue Integrity - Adult  Goal: Skin integrity remains intact  Description: 1.  Monitor for areas of redness  and/or skin breakdown  2.  Assess vascular access sites hourly  3.  Every 4-6 hours minimum:  Change oxygen saturation probe site  4.  Every 4-6 hours:  If on nasal continuous positive airway pressure, respiratory therapy assess nares and determine need for appliance change or resting period  3/19/2025 0159 by Valdez Salazar RN  Outcome: Progressing  Flowsheets (Taken 3/18/2025 2000)  Skin Integrity Remains Intact: Monitor for areas of redness and/or skin breakdown     Problem: Skin/Tissue Integrity - Adult  Goal: Oral mucous membranes remain intact  3/19/2025 0159 by Valdez Salazar RN  Outcome: Progressing     Problem: Musculoskeletal - Adult  Goal: Return mobility to safest level of function  3/19/2025 0159 by Valdez Salazar RN  Outcome: Progressing  Flowsheets (Taken 3/18/2025 2000)  Return Mobility to Safest Level of Function:   Assess patient stability and activity tolerance for standing, transferring and ambulating with or without assistive devices   Assist with transfers and ambulation using safe patient handling equipment as needed     Problem: Musculoskeletal - Adult  Goal: Return ADL status to a safe level of function  3/19/2025 0159 by Valdez Salazar RN  Outcome: Progressing  Flowsheets (Taken 3/18/2025 2000)  Return ADL Status to a Safe Level of Function: Administer medication as ordered     Problem: Gastrointestinal - Adult  Goal: Minimal or absence of nausea and vomiting  3/19/2025 0159 by Valdez Salazar RN  Outcome: Progressing  Flowsheets (Taken 3/18/2025 2000)  Minimal or absence of nausea and vomiting:   Administer IV fluids as ordered to ensure adequate hydration   Administer ordered antiemetic medications as needed   Provide nonpharmacologic comfort measures as appropriate     Problem: Gastrointestinal - Adult  Goal: Maintains or returns to baseline bowel function  3/19/2025 0159 by Valdez Salazar RN  Outcome: Progressing  Flowsheets (Taken 3/18/2025 2000)  Maintains or returns
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
## 2025-03-19 NOTE — PLAN OF CARE
Problem: Chronic Conditions and Co-morbidities  Goal: Patient's chronic conditions and co-morbidity symptoms are monitored and maintained or improved  3/19/2025 1514 by [place] Garcia RN  Outcome: Progressing  3/19/2025 0159 by Valdez Salazar RN  Outcome: Progressing  Flowsheets (Taken 3/18/2025 2000)  Care Plan - Patient's Chronic Conditions and Co-Morbidity Symptoms are Monitored and Maintained or Improved: Monitor and assess patient's chronic conditions and comorbid symptoms for stability, deterioration, or improvement     Problem: Discharge Planning  Goal: Discharge to home or other facility with appropriate resources  3/19/2025 1514 by [place] Garcia RN  Outcome: Progressing  3/19/2025 0159 by Valdez Salazar RN  Outcome: Progressing  Flowsheets (Taken 3/18/2025 2000)  Discharge to home or other facility with appropriate resources: Identify barriers to discharge with patient and caregiver     Problem: Pain  Goal: Verbalizes/displays adequate comfort level or baseline comfort level  3/19/2025 1514 by [place] Garcia RN  Outcome: Progressing  3/19/2025 0159 by Valdez Salazar RN  Outcome: Progressing     Problem: Safety - Adult  Goal: Free from fall injury  3/19/2025 1514 by [place] Garcia RN  Outcome: Progressing  3/19/2025 0159 by Valdez Salazar RN  Outcome: Progressing     Problem: Neurosensory - Adult  Goal: Achieves stable or improved neurological status  3/19/2025 1514 by [place] Garcia RN  Outcome: Progressing  3/19/2025 0159 by Valdez Salazar RN  Outcome: Progressing  Flowsheets (Taken 3/18/2025 2000)  Achieves stable or improved neurological status:   Assess for and report changes in neurological status   Maintain blood pressure and fluid volume within ordered parameters to optimize cerebral perfusion and minimize risk of hemorrhage  Goal: Achieves maximal functionality and self care  3/19/2025 1514 by [place] Garcia RN  Outcome: Progressing  3/19/2025 0159 by Marie

## 2025-03-19 NOTE — PROGRESS NOTES
Patient alert and oriented x4. Vital signs taken and recorded. Noted nausea and vomiting. PRN medication given per protocol. See mar.

## 2025-03-20 LAB
ALBUMIN SERPL-MCNC: 3.7 G/DL (ref 3.4–5)
ALBUMIN/GLOB SERPL: 1.5 {RATIO} (ref 1.1–2.2)
ALP SERPL-CCNC: 89 U/L (ref 40–129)
ALT SERPL-CCNC: 12 U/L (ref 10–40)
ANION GAP SERPL CALCULATED.3IONS-SCNC: 11 MMOL/L (ref 3–16)
AST SERPL-CCNC: 19 U/L (ref 15–37)
BASOPHILS # BLD: 0.1 K/UL (ref 0–0.2)
BASOPHILS NFR BLD: 0.8 %
BILIRUB SERPL-MCNC: 0.5 MG/DL (ref 0–1)
BUN SERPL-MCNC: 3 MG/DL (ref 7–20)
CALCIUM SERPL-MCNC: 8.8 MG/DL (ref 8.3–10.6)
CHLORIDE SERPL-SCNC: 104 MMOL/L (ref 99–110)
CO2 SERPL-SCNC: 27 MMOL/L (ref 21–32)
CREAT SERPL-MCNC: 0.7 MG/DL (ref 0.6–1.1)
DEPRECATED RDW RBC AUTO: 13.6 % (ref 12.4–15.4)
EOSINOPHIL # BLD: 0.2 K/UL (ref 0–0.6)
EOSINOPHIL NFR BLD: 2.3 %
GFR SERPLBLD CREATININE-BSD FMLA CKD-EPI: >90 ML/MIN/{1.73_M2}
GLUCOSE SERPL-MCNC: 116 MG/DL (ref 70–99)
HCT VFR BLD AUTO: 37.8 % (ref 36–48)
HGB BLD-MCNC: 13 G/DL (ref 12–16)
LIPASE SERPL-CCNC: 79 U/L (ref 13–60)
LYMPHOCYTES # BLD: 2.5 K/UL (ref 1–5.1)
LYMPHOCYTES NFR BLD: 27.9 %
MCH RBC QN AUTO: 28.4 PG (ref 26–34)
MCHC RBC AUTO-ENTMCNC: 34.3 G/DL (ref 31–36)
MCV RBC AUTO: 82.9 FL (ref 80–100)
MONOCYTES # BLD: 0.8 K/UL (ref 0–1.3)
MONOCYTES NFR BLD: 9.6 %
NEUTROPHILS # BLD: 5.2 K/UL (ref 1.7–7.7)
NEUTROPHILS NFR BLD: 59.4 %
PLATELET # BLD AUTO: 222 K/UL (ref 135–450)
PMV BLD AUTO: 9.3 FL (ref 5–10.5)
POTASSIUM SERPL-SCNC: 3.8 MMOL/L (ref 3.5–5.1)
PROT SERPL-MCNC: 6.1 G/DL (ref 6.4–8.2)
RBC # BLD AUTO: 4.56 M/UL (ref 4–5.2)
SODIUM SERPL-SCNC: 142 MMOL/L (ref 136–145)
WBC # BLD AUTO: 8.8 K/UL (ref 4–11)

## 2025-03-20 PROCEDURE — 97535 SELF CARE MNGMENT TRAINING: CPT

## 2025-03-20 PROCEDURE — 80053 COMPREHEN METABOLIC PANEL: CPT

## 2025-03-20 PROCEDURE — 6360000002 HC RX W HCPCS: Performed by: INTERNAL MEDICINE

## 2025-03-20 PROCEDURE — 83690 ASSAY OF LIPASE: CPT

## 2025-03-20 PROCEDURE — 2500000003 HC RX 250 WO HCPCS: Performed by: INTERNAL MEDICINE

## 2025-03-20 PROCEDURE — 36415 COLL VENOUS BLD VENIPUNCTURE: CPT

## 2025-03-20 PROCEDURE — 1200000000 HC SEMI PRIVATE

## 2025-03-20 PROCEDURE — 85025 COMPLETE CBC W/AUTO DIFF WBC: CPT

## 2025-03-20 PROCEDURE — 2500000003 HC RX 250 WO HCPCS: Performed by: ANESTHESIOLOGY

## 2025-03-20 PROCEDURE — 94760 N-INVAS EAR/PLS OXIMETRY 1: CPT

## 2025-03-20 PROCEDURE — 2580000003 HC RX 258: Performed by: INTERNAL MEDICINE

## 2025-03-20 PROCEDURE — 6370000000 HC RX 637 (ALT 250 FOR IP): Performed by: INTERNAL MEDICINE

## 2025-03-20 RX ORDER — LEVETIRACETAM 500 MG/1
1000 TABLET ORAL 2 TIMES DAILY
Status: DISCONTINUED | OUTPATIENT
Start: 2025-03-20 | End: 2025-03-21 | Stop reason: HOSPADM

## 2025-03-20 RX ADMIN — DOCUSATE SODIUM 100 MG: 100 CAPSULE, LIQUID FILLED ORAL at 20:37

## 2025-03-20 RX ADMIN — METRONIDAZOLE 500 MG: 500 INJECTION, SOLUTION INTRAVENOUS at 06:03

## 2025-03-20 RX ADMIN — SODIUM CHLORIDE, PRESERVATIVE FREE 40 MG: 5 INJECTION INTRAVENOUS at 20:37

## 2025-03-20 RX ADMIN — FAMOTIDINE 20 MG: 20 TABLET, FILM COATED ORAL at 05:28

## 2025-03-20 RX ADMIN — SODIUM CHLORIDE, POTASSIUM CHLORIDE, SODIUM LACTATE AND CALCIUM CHLORIDE: 600; 310; 30; 20 INJECTION, SOLUTION INTRAVENOUS at 15:15

## 2025-03-20 RX ADMIN — BUSPIRONE HYDROCHLORIDE 5 MG: 5 TABLET ORAL at 20:37

## 2025-03-20 RX ADMIN — ATORVASTATIN CALCIUM 80 MG: 80 TABLET, FILM COATED ORAL at 20:37

## 2025-03-20 RX ADMIN — GABAPENTIN 100 MG: 100 CAPSULE ORAL at 20:37

## 2025-03-20 RX ADMIN — METRONIDAZOLE 500 MG: 500 INJECTION, SOLUTION INTRAVENOUS at 22:29

## 2025-03-20 RX ADMIN — LEVETIRACETAM 1000 MG: 100 INJECTION INTRAVENOUS at 08:42

## 2025-03-20 RX ADMIN — LEVETIRACETAM 1000 MG: 500 TABLET, FILM COATED ORAL at 20:37

## 2025-03-20 RX ADMIN — SODIUM CHLORIDE, PRESERVATIVE FREE 40 MG: 5 INJECTION INTRAVENOUS at 08:43

## 2025-03-20 RX ADMIN — SODIUM CHLORIDE, PRESERVATIVE FREE 10 ML: 5 INJECTION INTRAVENOUS at 08:44

## 2025-03-20 RX ADMIN — ENOXAPARIN SODIUM 40 MG: 100 INJECTION SUBCUTANEOUS at 08:43

## 2025-03-20 RX ADMIN — METRONIDAZOLE 500 MG: 500 INJECTION, SOLUTION INTRAVENOUS at 15:17

## 2025-03-20 NOTE — PROGRESS NOTES
Occupational Therapy  Facility/Department: [address] MED SURG  Occupational Therapy Daily Treatment     Name: Lydia Harris  : 1970  MRN: [id number]  Date of Service: 3/20/2025    Discharge Recommendations:  Home with Home health OT          Patient Diagnosis(es): The primary encounter diagnosis was Bilious vomiting with nausea. Diagnoses of Generalized abdominal pain, Hypokalemia, JANIE (acute kidney injury), and Nausea and vomiting, unspecified vomiting type were also pertinent to this visit.  Past Medical History:  has a past medical history of Brain tumor (HCC), Depression, GERD (gastroesophageal reflux disease), Gestational diabetes, Heavy periods, Hypertension, Legally blind, Migraines, Seizures (HCC), Urinary incontinence, and Visual impairment.  Past Surgical History:  has a past surgical history that includes Cholecystectomy; brain surgery; Tubal ligation ();  section; Tonsillectomy; Tympanoplasty (Bilateral); Upper gastrointestinal endoscopy; Colonoscopy; and Upper gastrointestinal endoscopy (N/A, 3/17/2025).           Assessment  Performance deficits / Impairments: Decreased functional mobility ;Decreased ADL status;Decreased strength;Decreased safe awareness;Decreased balance;Decreased fine motor control;Decreased endurance;Decreased vision/visual deficit  Assessment: 53 y/o female admitted 3/14/2025 with nausea/vomitting, JANIE, and C-diff r/o.  PTA pt lives in Assisted Living at [facility]. Pt reports she is ind with ADLs and use of white cane. Today, pt required SBA for bed mobility and sitting EOB. Pt completed fxl transfers/mobility w/use of cane w/SBA-CGA. Pt w/improved activity tolerance and participation w/ADLs/mobility this date. Min A for LB dressing, SBA for toileting tasks. Verbal cues for directioning required during ADLs/mobility d/t vision impairments. Pt is functioning below baseline and benefit from skilled therapy while in hospital. Pt states plan is to

## 2025-03-20 NOTE — PROGRESS NOTES
Patient alert and oriented x4. Vital signs taken and recorded. Patient assisted to commode safely and comfortably. Electronically signed by Valdez Salazar RN on 3/19/2025 at 11:17 PM

## 2025-03-20 NOTE — PLAN OF CARE
Problem: Chronic Conditions and Co-morbidities  Goal: Patient's chronic conditions and co-morbidity symptoms are monitored and maintained or improved  3/20/2025 1239 by Ada Garcia RN  Outcome: Progressing  3/19/2025 2313 by Valdez Salazar RN  Outcome: Progressing  Flowsheets (Taken 3/19/2025 2000)  Care Plan - Patient's Chronic Conditions and Co-Morbidity Symptoms are Monitored and Maintained or Improved: Monitor and assess patient's chronic conditions and comorbid symptoms for stability, deterioration, or improvement     Problem: Discharge Planning  Goal: Discharge to home or other facility with appropriate resources  3/20/2025 1239 by Ada Garcia RN  Outcome: Progressing  3/19/2025 2313 by Valdez Salazar RN  Outcome: Progressing  Flowsheets (Taken 3/19/2025 2000)  Discharge to home or other facility with appropriate resources: Identify barriers to discharge with patient and caregiver     Problem: Pain  Goal: Verbalizes/displays adequate comfort level or baseline comfort level  3/20/2025 1239 by Ada Garcia RN  Outcome: Progressing  3/19/2025 2313 by Valdez Salazar RN  Outcome: Progressing     Problem: Safety - Adult  Goal: Free from fall injury  3/20/2025 1239 by Ada Garcia RN  Outcome: Progressing  3/19/2025 2313 by Valdez Salazar RN  Outcome: Progressing     Problem: Neurosensory - Adult  Goal: Achieves stable or improved neurological status  3/20/2025 1239 by Ada Garcia RN  Outcome: Progressing  3/19/2025 2313 by Valdez Salazar RN  Outcome: Progressing  Flowsheets (Taken 3/19/2025 2000)  Achieves stable or improved neurological status:   Assess for and report changes in neurological status   Maintain blood pressure and fluid volume within ordered parameters to optimize cerebral perfusion and minimize risk of hemorrhage  Goal: Achieves maximal functionality and self care  3/20/2025 1239 by Ada Garcia RN  Outcome: Progressing  3/19/2025 2313 by Marie

## 2025-03-20 NOTE — PROGRESS NOTES
V2.0    [facility] [place]      Name:  Lydia Harris /Age/Sex: 1970  (54 y.o. female)   MRN & CSN:  [id number] & [id number] Encounter Date/Time: 3/20/2025 7:26 AM EDT   Location:  N3B-1183/4254-01 PCP: No primary care provider on file.     Attending:Brooks Dixon MD       Hospital Day: 7    Assessment and Recommendations   Lydia Harris is a 54 y.o. female who presents with Intractable nausea and vomiting    54-year-old patient legally blind presented with abdominal discomfort nausea vomiting and severe hypokalemia with symptoms fluctuating, GI consulted status post EGD symptomatic management  Plan:   Intractable nausea and vomiting, CT scan nonspecific lipase elevated CT scan did not show pancreatitis GI consulted, status post EGD.  No significant finding overall improving but with fluctuation of symptoms, as she did vomit again this morning. Discussed with nursing.  To note that lipase redrawn today and it is at 79 down from 245 on admission  Severe hypokalemia on admission, has been replacing aggressively.  Potassium 3.8 this morning  Hypercalcemia calcium 11.1 on admission improved.  Calcium down to 8.8 this morning  Leukocytosis white count 17.5 on admission likely reactive, improved.  White count 8.8 this morning  Acute kidney injury hypovolemic due to dehydration creatinine improved.  Creatinine at 0.7 today  Possible UTI started on IV antibiotics started on admission  Seizure disorder continue home medication  Hypophosphatemia replaced  Legally blind supportive care        Diet ADULT DIET; Regular   DVT Prophylaxis [] Lovenox, []  Heparin, [] SCDs, [] Ambulation,  [] Eliquis, [] Xarelto  [] Coumadin   Code Status Full Code             Personally reviewed Lab Studies and Imaging     Discussed management of the case with GI who recommended supportive measures    Rhythm strip independently interpreted by myself heart rate 63 QRS 01 QT 0 48 no ST elevation      Medical Decision  hours.    Invalid input(s): \"ALB\"  Lipids: No results found for: \"CHOL\", \"HDL\", \"TRIG\"  Hemoglobin A1C: No results found for: \"LABA1C\"  TSH:   Lab Results   Component Value Date/Time    TSH 0.75 03/15/2025 06:42 AM     Troponin: No results found for: \"TROPONINT\"  Lactic Acid: No results for input(s): \"LACTA\" in the last 72 hours.  BNP: No results for input(s): \"PROBNP\" in the last 72 hours.  UA:  Lab Results   Component Value Date/Time    NITRU Negative 03/14/2025 12:25 PM    COLORU DARK YELLOW 03/14/2025 12:25 PM    PHUR 5.0 03/14/2025 12:25 PM    PHUR 5.5 07/12/2016 11:17 PM    WBCUA 21-50 03/14/2025 12:25 PM    RBCUA 0-2 03/14/2025 12:25 PM    BACTERIA 4+ 03/14/2025 12:25 PM    CLARITYU TURBID 03/14/2025 12:25 PM    LEUKOCYTESUR MODERATE 03/14/2025 12:25 PM    UROBILINOGEN 1.0 03/14/2025 12:25 PM    BILIRUBINUR Negative 03/14/2025 12:25 PM    BLOODU Negative 03/14/2025 12:25 PM    GLUCOSEU Negative 03/14/2025 12:25 PM    KETUA TRACE 03/14/2025 12:25 PM     Urine Cultures:   Lab Results   Component Value Date/Time    LABURIN  03/14/2025 12:25 PM     <50,000 CFU/ml mixed skin/urogenital fredy. No further workup    LABURIN 50,000 CFU/ml 03/14/2025 12:25 PM     Blood Cultures: No results found for: \"BC\"  No results found for: \"BLOODCULT2\"  Organism:   Lab Results   Component Value Date/Time    ORG Escherichia coli 03/14/2025 12:25 PM         Electronically signed by Brooks Dixon MD on 3/20/2025 at 7:26 AM  Comment: Please note this report has been produced using speech recognition software and may contain errors related to that system including errors in grammar, punctuation, and spelling, as well as words and phrases that may be inappropriate. If there are any questions or concerns, please feel free to contact the dictating provider for clarification.

## 2025-03-20 NOTE — PROGRESS NOTES
Pt attempted to take PO meds. After taking first pill, pt started to vomit. Pt stated that this usually happens only when taking pills. Pt was not able to take anymore PO meds. MD Destiny made aware. No new orders. RN offered to give Zofran, pt refused.     Call light and belongings left within reach.

## 2025-03-20 NOTE — PLAN OF CARE
Problem: Chronic Conditions and Co-morbidities  Goal: Patient's chronic conditions and co-morbidity symptoms are monitored and maintained or improved  3/19/2025 2313 by Valdez Salazar RN  Outcome: Progressing  Flowsheets (Taken 3/19/2025 2000)  Care Plan - Patient's Chronic Conditions and Co-Morbidity Symptoms are Monitored and Maintained or Improved: Monitor and assess patient's chronic conditions and comorbid symptoms for stability, deterioration, or improvement     Problem: Discharge Planning  Goal: Discharge to home or other facility with appropriate resources  3/19/2025 2313 by Valdez Salazar RN  Outcome: Progressing  Flowsheets (Taken 3/19/2025 2000)  Discharge to home or other facility with appropriate resources: Identify barriers to discharge with patient and caregiver     Problem: Pain  Goal: Verbalizes/displays adequate comfort level or baseline comfort level  3/19/2025 2313 by Valdez Salazar RN  Outcome: Progressing     Problem: Safety - Adult  Goal: Free from fall injury  3/19/2025 2313 by Valdez Salazar RN  Outcome: Progressing     Problem: Neurosensory - Adult  Goal: Achieves stable or improved neurological status  3/19/2025 2313 by Valdez Salazar RN  Outcome: Progressing  Flowsheets (Taken 3/19/2025 2000)  Achieves stable or improved neurological status:   Assess for and report changes in neurological status   Maintain blood pressure and fluid volume within ordered parameters to optimize cerebral perfusion and minimize risk of hemorrhage     Problem: Neurosensory - Adult  Goal: Achieves maximal functionality and self care  3/19/2025 2313 by Valdez Salazar RN  Outcome: Progressing  Flowsheets (Taken 3/19/2025 2000)  Achieves maximal functionality and self care: Monitor swallowing and airway patency with patient fatigue and changes in neurological status     Problem: Skin/Tissue Integrity - Adult  Goal: Skin integrity remains intact  Description: 1.  Monitor for areas of redness

## 2025-03-21 VITALS
BODY MASS INDEX: 41.33 KG/M2 | TEMPERATURE: 98.8 F | HEART RATE: 103 BPM | SYSTOLIC BLOOD PRESSURE: 144 MMHG | HEIGHT: 59 IN | RESPIRATION RATE: 15 BRPM | WEIGHT: 205.03 LBS | OXYGEN SATURATION: 95 % | DIASTOLIC BLOOD PRESSURE: 108 MMHG

## 2025-03-21 PROCEDURE — 94760 N-INVAS EAR/PLS OXIMETRY 1: CPT

## 2025-03-21 PROCEDURE — 97530 THERAPEUTIC ACTIVITIES: CPT

## 2025-03-21 PROCEDURE — 97535 SELF CARE MNGMENT TRAINING: CPT

## 2025-03-21 PROCEDURE — 6360000002 HC RX W HCPCS: Performed by: INTERNAL MEDICINE

## 2025-03-21 PROCEDURE — 2580000003 HC RX 258: Performed by: INTERNAL MEDICINE

## 2025-03-21 PROCEDURE — 6370000000 HC RX 637 (ALT 250 FOR IP): Performed by: INTERNAL MEDICINE

## 2025-03-21 PROCEDURE — 2500000003 HC RX 250 WO HCPCS: Performed by: INTERNAL MEDICINE

## 2025-03-21 RX ADMIN — ONDANSETRON 4 MG: 2 INJECTION, SOLUTION INTRAMUSCULAR; INTRAVENOUS at 09:41

## 2025-03-21 RX ADMIN — ENOXAPARIN SODIUM 40 MG: 100 INJECTION SUBCUTANEOUS at 09:41

## 2025-03-21 RX ADMIN — METOPROLOL SUCCINATE 50 MG: 50 TABLET, EXTENDED RELEASE ORAL at 09:42

## 2025-03-21 RX ADMIN — Medication 2000 UNITS: at 09:42

## 2025-03-21 RX ADMIN — BUSPIRONE HYDROCHLORIDE 5 MG: 5 TABLET ORAL at 09:42

## 2025-03-21 RX ADMIN — ISOSORBIDE MONONITRATE 30 MG: 60 TABLET, EXTENDED RELEASE ORAL at 09:42

## 2025-03-21 RX ADMIN — LEVETIRACETAM 1000 MG: 500 TABLET, FILM COATED ORAL at 09:42

## 2025-03-21 RX ADMIN — DOCUSATE SODIUM 100 MG: 100 CAPSULE, LIQUID FILLED ORAL at 09:42

## 2025-03-21 RX ADMIN — SODIUM CHLORIDE, PRESERVATIVE FREE 40 MG: 5 INJECTION INTRAVENOUS at 09:42

## 2025-03-21 RX ADMIN — FLUOXETINE HYDROCHLORIDE 40 MG: 20 CAPSULE ORAL at 09:41

## 2025-03-21 RX ADMIN — FAMOTIDINE 20 MG: 20 TABLET, FILM COATED ORAL at 05:55

## 2025-03-21 RX ADMIN — SODIUM CHLORIDE, POTASSIUM CHLORIDE, SODIUM LACTATE AND CALCIUM CHLORIDE: 600; 310; 30; 20 INJECTION, SOLUTION INTRAVENOUS at 05:43

## 2025-03-21 RX ADMIN — METRONIDAZOLE 500 MG: 500 INJECTION, SOLUTION INTRAVENOUS at 05:57

## 2025-03-21 RX ADMIN — OXYBUTYNIN CHLORIDE 10 MG: 10 TABLET, EXTENDED RELEASE ORAL at 09:41

## 2025-03-21 NOTE — PROGRESS NOTES
Occupational Therapy  Facility/Department: [address] MED SURG  Occupational Therapy    Name: Lydia Harris  : 1970  MRN: [id number]  Date of Service: 3/21/2025    Discharge Recommendations:  Home with Home health OT          Patient Diagnosis(es): The primary encounter diagnosis was Bilious vomiting with nausea. Diagnoses of Generalized abdominal pain, Hypokalemia, JANIE (acute kidney injury), and Nausea and vomiting, unspecified vomiting type were also pertinent to this visit.  Past Medical History:  has a past medical history of Brain tumor (HCC), Depression, GERD (gastroesophageal reflux disease), Gestational diabetes, Heavy periods, Hypertension, Legally blind, Migraines, Seizures (HCC), Urinary incontinence, and Visual impairment.  Past Surgical History:  has a past surgical history that includes Cholecystectomy; brain surgery; Tubal ligation ();  section; Tonsillectomy; Tympanoplasty (Bilateral); Upper gastrointestinal endoscopy; Colonoscopy; and Upper gastrointestinal endoscopy (N/A, 3/17/2025).           Assessment  Performance deficits / Impairments: Decreased functional mobility ;Decreased ADL status;Decreased strength;Decreased safe awareness;Decreased balance;Decreased fine motor control;Decreased endurance;Decreased vision/visual deficit  Assessment: 53 y/o female admitted 3/14/2025 with nausea/vomitting, JANIE, and C-diff r/o.  PTA pt lives in Assisted Living at [facility]. Pt reports she is ind with ADLs and use of white cane. Today, pt required SBA for bed mobility and sitting EOB. Pt completed fxl transfers/mobility w/use of cane w/SBA. Pt w/improved activity tolerance and participation w/ADLs/mobility this date. SBA toileting, LB dressing, and grooming tasks. Verbal cues for directioning required during ADLs/mobility d/t vision impairments. Pt is functioning below baseline and benefit from skilled therapy while in hospital. Pt states plan is to return to Assisted  Demonstration;Verbal  Barriers to Learning: Vision  Education Outcome: Verbalized understanding;Demonstrated understanding                          AM-PAC - ADL  AM-PAC Daily Activity - Inpatient   How much help is needed for putting on and taking off regular lower body clothing?: A Little  How much help is needed for bathing (which includes washing, rinsing, drying)?: A Little  How much help is needed for toileting (which includes using toilet, bedpan, or urinal)?: A Little  How much help is needed for putting on and taking off regular upper body clothing?: A Little  How much help is needed for taking care of personal grooming?: A Little  How much help for eating meals?: A Little  AM-PAC Inpatient Daily Activity Raw Score: 18  AM-PAC Inpatient ADL T-Scale Score : 38.66  ADL Inpatient CMS 0-100% Score: 46.65  ADL Inpatient CMS G-Code Modifier : CK    Goals  Short Term Goals  Time Frame for Short Term Goals: Prior to DC:  Short Term Goal 1: Pt will complete ADL transfers/mobility with supervision  Short Term Goal 2: Pt will tolerate standing > 5 min for functional task with supervision  Short Term Goal 3: Pt will complete LB Dressing with supervision  Short Term Goal 4: Pt will complete toileting with supervision  Patient Goals   Patient goals : to return to assisted living      Therapy Time   Individual Concurrent Group Co-treatment   Time In 1127         Time Out 1150         Minutes 23                 BELINDA ORNELAS/MARIANN

## 2025-03-21 NOTE — PLAN OF CARE
Problem: Chronic Conditions and Co-morbidities  Goal: Patient's chronic conditions and co-morbidity symptoms are monitored and maintained or improved  3/21/2025 1301 by Margarita Borrego RN  Outcome: Progressing  3/21/2025 0126 by Mary Ware RN  Outcome: Progressing  Flowsheets (Taken 3/21/2025 0126)  Care Plan - Patient's Chronic Conditions and Co-Morbidity Symptoms are Monitored and Maintained or Improved: Monitor and assess patient's chronic conditions and comorbid symptoms for stability, deterioration, or improvement     Problem: Discharge Planning  Goal: Discharge to home or other facility with appropriate resources  3/21/2025 1301 by Margarita Borrego RN  Outcome: Progressing  3/21/2025 0126 by Mary Ware RN  Outcome: Progressing  Flowsheets (Taken 3/21/2025 0126)  Discharge to home or other facility with appropriate resources:   Identify barriers to discharge with patient and caregiver   Identify discharge learning needs (meds, wound care, etc)   Arrange for needed discharge resources and transportation as appropriate   Arrange for interpreters to assist at discharge as needed     Problem: Pain  Goal: Verbalizes/displays adequate comfort level or baseline comfort level  3/21/2025 1301 by Margarita Borrego RN  Outcome: Progressing  3/21/2025 0126 by Mary Ware RN  Outcome: Progressing  Flowsheets (Taken 3/21/2025 0126)  Verbalizes/displays adequate comfort level or baseline comfort level:   Assess pain using appropriate pain scale   Encourage patient to monitor pain and request assistance   Administer analgesics based on type and severity of pain and evaluate response   Implement non-pharmacological measures as appropriate and evaluate response   Consider cultural and social influences on pain and pain management     Problem: Safety - Adult  Goal: Free from fall injury  3/21/2025 1301 by Margarita Borrego RN  Outcome: Progressing  3/21/2025 0126 by Mary Ware RN  Outcome: Progressing     Problem:

## 2025-03-21 NOTE — CARE COORDINATION
Case Management Discharge Note          Date / Time of Note: 3/21/2025 12:14 PM                  Patient Name: Lydia Harris   YOB: 1970  Diagnosis: Generalized abdominal pain [R10.84]  Intractable nausea and vomiting [R11.2]  Bilious vomiting with nausea [R11.14]   Date / Time: 3/14/2025 11:43 AM    Financial:  Payor: HU[employer] MEDICARE / Plan: HU[employer] GOLD PLUS HMO / Product Type: *No Product type* /      Pharmacy:    [facility], [place] - [address] [phone] - F [phone] [address] [facility] [address]  Phone: [phone] Fax: [phone]      Assistance purchasing medications?: Potential Assistance Purchasing Medications: No  Assistance provided by Case Management: None at this time    DISCHARGE Disposition: Home- No Services Needed      Transportation:  Transportation PLAN for discharge:  [facility]    Mode of Transport: Bus  Reason for medical transport: Other: Blind  Name of Transport Company:  [employer] [place]   Phone: [phone]  Time of Transport: 2PM    Transport form completed: Not Indicated    IMM Completed:   Yes, Case management has presented and reviewed IMM letter #2.       IMM Letter date given:: 03/19/25   .   Patient and/or family/POA verbalized understanding of their medicare rights and appeal process if needed. Patient and/or family/POA has signed, initialed and placed the date and time on IMM letter #2 on the the appropriate lines. Copy of letter offered and they are aware that the original copy of IMM letter #2 is available prior to discharge from the paper chart on the unit.  Electronic documentation has been entered into epic for IMM letter #2 and original paper copy has been added to the paper chart at the nurses station.     Additional CM Notes: Patient to return to Assisted Living at [place]    The Plan for Transition of Care is related to the following treatment goals of Generalized abdominal pain  [R10.84]  Intractable nausea and vomiting [R11.2]  Bilious vomiting with nausea [R11.14]    The Patient and/or patient representative Lydia and her family were provided with a choice of provider and agrees with the discharge plan Yes    Freedom of choice list was provided with basic dialogue that supports the patient's individualized plan of care/goals and shares the quality data associated with the providers. Yes    Nu Perez [facility]   Case Management Department  Ph: [phone]  Fax: [phone]

## 2025-03-21 NOTE — PLAN OF CARE
Problem: Chronic Conditions and Co-morbidities  Goal: Patient's chronic conditions and co-morbidity symptoms are monitored and maintained or improved  3/21/2025 0126 by Mary Ware RN  Outcome: Progressing  Flowsheets (Taken 3/21/2025 0126)  Care Plan - Patient's Chronic Conditions and Co-Morbidity Symptoms are Monitored and Maintained or Improved: Monitor and assess patient's chronic conditions and comorbid symptoms for stability, deterioration, or improvement  3/20/2025 1239 by Ada Garcia, RN  Outcome: Progressing     Problem: Discharge Planning  Goal: Discharge to home or other facility with appropriate resources  3/21/2025 0126 by Mary Ware RN  Outcome: Progressing  Flowsheets (Taken 3/21/2025 0126)  Discharge to home or other facility with appropriate resources:   Identify barriers to discharge with patient and caregiver   Identify discharge learning needs (meds, wound care, etc)   Arrange for needed discharge resources and transportation as appropriate   Arrange for interpreters to assist at discharge as needed  3/20/2025 1239 by Ada Garcia, RN  Outcome: Progressing     Problem: Pain  Goal: Verbalizes/displays adequate comfort level or baseline comfort level  3/21/2025 0126 by Mary Ware RN  Outcome: Progressing  Flowsheets (Taken 3/21/2025 0126)  Verbalizes/displays adequate comfort level or baseline comfort level:   Assess pain using appropriate pain scale   Encourage patient to monitor pain and request assistance   Administer analgesics based on type and severity of pain and evaluate response   Implement non-pharmacological measures as appropriate and evaluate response   Consider cultural and social influences on pain and pain management  3/20/2025 1239 by Ada Garcia, RN  Outcome: Progressing     Problem: Neurosensory - Adult  Goal: Achieves stable or improved neurological status  3/21/2025 0126 by Mary Ware RN  Outcome: Progressing  3/20/2025 1239 by Ada Garcia,  Addended by: Brent Hedrick on: 8/24/2023 12:26 PM     Modules accepted: Orders RN  Outcome: Progressing     Problem: Gastrointestinal - Adult  Goal: Minimal or absence of nausea and vomiting  3/21/2025 0126 by Mary Ware RN  Outcome: Progressing  Flowsheets (Taken 3/21/2025 0126)  Minimal or absence of nausea and vomiting:   Administer IV fluids as ordered to ensure adequate hydration   Nasogastric tube to low intermittent suction as ordered   Provide nonpharmacologic comfort measures as appropriate   Maintain NPO status until nausea and vomiting are resolved   Administer ordered antiemetic medications as needed  3/20/2025 1239 by Ada Garcia, RN  Outcome: Progressing

## 2025-03-21 NOTE — PROGRESS NOTES
Pt tolerated all her nightly meds well. Meds given with apple sauce. No signs of nausea or vomiting noted.

## 2025-03-21 NOTE — PROGRESS NOTES
IV and tele removed. Discharge instructions given and explained advised pt to give discharge packet to SNF. Pt agreeable. Report given to Heaven BIANCHI at [facility]

## 2025-03-21 NOTE — DISCHARGE INSTR - COC
Continuity of Care Form    Patient Name: Lydia Harris   :  1970  MRN:  [id number]    Admit date:  3/14/2025  Discharge date:  3/21/25    Code Status Order: Full Code   Advance Directives:     Admitting Physician:  Brooks Dixon MD  PCP: No primary care provider on file.    Discharging Nurse: Margarita Borrego  Discharging Hospital Unit/Room#: X5B-3049/4254-01  Discharging Unit Phone Number: [phone]    Emergency Contact:   Extended Emergency Contact Information  Primary Emergency Contact: Cory Styles  Address: [address] [place] [address]  Home Phone: [phone]  Mobile Phone: [phone]  Relation: Child  Secondary Emergency Contact: Krishna Burr  Address: [address]           [place] [address]  Home Phone: [phone]  Mobile Phone: [phone]  Relation: Child  Preferred language: English   needed? No    Past Surgical History:  Past Surgical History:   Procedure Laterality Date    BRAIN SURGERY      2013 and december     SECTION      x`s 3    CHOLECYSTECTOMY      COLONOSCOPY      -Dr. Odonnell    TONSILLECTOMY      TUBAL LIGATION  2011    TYMPANOPLASTY Bilateral     artificial ear drums    UPPER GASTROINTESTINAL ENDOSCOPY      -Dr. Odonnell    UPPER GASTROINTESTINAL ENDOSCOPY N/A 3/17/2025    ESOPHAGOGASTRODUODENOSCOPY BIOPSY performed by Rafael Fitzgerald MD at [facility] ENDOSCOPY       Immunization History:   Immunization History   Administered Date(s) Administered    COVID-19, MODERNA Booster BLUE border, (age 18y+), IM, 50mcg/0.25mL 10/28/2021    COVID-19, PFIZER PURPLE top, DILUTE for use, (age 12 y+), 30mcg/0.3mL 2021, 2021       Active Problems:  Patient Active Problem List   Diagnosis Code    Heavy periods N92.0    Intractable nausea and vomiting R11.2       Isolation/Infection:   Isolation            No Isolation          Patient Infection Status    None to display              Nurse  Status Date: ***    Readmission Risk Assessment Score:  [facility] RISK OF UNPLANNED READMISSION 2.0             11.4 Total Score        Discharging to Facility/ Agency   Name:   Address:  Phone:  Fax:    Dialysis Facility (if applicable)   Name:  Address:  Dialysis Schedule:  Phone:  Fax:    / signature: {Esignature:860004585}    PHYSICIAN SECTION    Prognosis: {Prognosis:0742599693}    Condition at Discharge: { Patient Condition:728520020}    Rehab Potential (if transferring to Rehab): {Prognosis:1959790232}    Recommended Labs or Other Treatments After Discharge: ***    Physician Certification: I certify the above information and transfer of Lydia Harris  is necessary for the continuing treatment of the diagnosis listed and that she requires {Admit to Appropriate Level of Care:26095} for {GREATER/LESS:274543125} 30 days.     Update Admission H&P: {CHP DME Changes in HandP:800606255}    PHYSICIAN SIGNATURE:  {Esignature:380921764}

## 2025-03-21 NOTE — PROGRESS NOTES
Pt slept well all night without any signs of nausea or vomiting. Morning meds administered without any issues. No complains of nausea throughout the night.

## 2025-03-21 NOTE — DISCHARGE SUMMARY
V2.0  Discharge Summary    Name:  Lydia Harris /Age/Sex: 1970 (54 y.o. female)   Admit Date: 3/14/2025  Discharge Date: 3/21/25    MRN & CSN:  [id number] & [id number] Encounter Date and Time 3/21/25 11:36 AM EDT    Attending:  Fabiola Guzman MD Discharging Provider: Fabiola Guzman MD     Discharge Diagnosis:     # Intractable nausea and vomiting, resolved  # Hypokalemia  # Hypercalcemia  # Leukocytosis  # JANIE  # UTI  # Seizure disorder  # Hypophosphatemia  # Legal blindness    Consultants:  IP CONSULT TO FAMILY MEDICINE  IP CONSULT TO GI    Brief HPI:    Per admitting H and P...\"  Chief Complaint: Nausea vomiting abdominal pain, patient states that she is having the symptoms for a month or so but significantly worse in the last week at this time she is residing in an assisted living facility legally blind denies fever or chills stated that she is having chronic diarrhea denies any chest pain or shortness of breath on presentation to the hospital found to have elevated creatinine lipase being admitted for further management and treatment CT scan without contrast completed in ED without specific finding.  Discussed with ED MD agrees plan to admit \"      Brief hospital course:     Please refer to the admitting H and P for details surrounding the initial presentation.     Patient with legal blindness, from an assisted living facility, presented with abdominal discomfort, nausea and vomiting, patient was noted to have significant electrolyte derangements.  Patient had GI consultation, underwent EGD on 3/17, revealed minor gastritis, no H. pylori infection.  Patient was also noted to have slightly elevated lipase but without any evidence of pancreatitis on CT scan, no epigastric pain.  Patient was treated supportively, diet was slowly advanced and at this time she is tolerating a diet without any problems.    Patient was noted to have multiple electrolyte derangements, all of them likely related to clinical

## 2025-03-21 NOTE — PROGRESS NOTES
[facility] - Physical Therapy   Phone: [phone]    Physical Therapy    [] Initial Evaluation            [x] Daily Treatment Note         [] Discharge Summary      Patient: Lydia Harris   : 1970   MRN: [id number]   Date of Service:  3/21/2025  Staff Mobility Recommendation: SBA with white cane    AM-PAC score:   Discharge Recommendations: Return to [facility]    Admitting Diagnosis: Intractable nausea and vomiting  Ordering Physician: Brooks Dixon MD   Current Admission Summary: Pt is a 54 y.o. female who presented to the ED on 3/14/25 with n/v. Awaiting EGD.  Past Medical History:  has a past medical history of Brain tumor (HCC), Depression, GERD (gastroesophageal reflux disease), Gestational diabetes, Heavy periods, Hypertension, Legally blind, Migraines, Seizures (HCC), Urinary incontinence, and Visual impairment.  Past Surgical History:  has a past surgical history that includes Cholecystectomy; brain surgery; Tubal ligation ();  section; Tonsillectomy; Tympanoplasty (Bilateral); Upper gastrointestinal endoscopy; Colonoscopy; and Upper gastrointestinal endoscopy (N/A, 3/17/2025).    Assessment  Activity Tolerance: Good  Impairments Requiring Therapeutic Intervention: decreased functional mobility  Prognosis: good    Clinical Assessment: Prior to admission, pt living at [facility], independent with ADLs and ambulatory with white cane. Pt currently functioning near baseline. Able to progress amb short distance + participate in seated exercise. Limited by nausea/vomiting and loose BM on the BSC. Anticipate return to [facility].    DME Required For Discharge: no DME required at discharge    _________________________________________________________________________________________________________________________________________________________________________  Restrictions:  Precautions/Restrictions: medium fall  risk, legally blind  Weight Bearing Restrictions: no restrictions    Required Braces/Orthotics: no braces required  Positional Restrictions:no positional restrictions      Home Environment / Functional History  Type of Home: Facility ([facility])  Home Layout: One level  Home Access: Level entry  Bathroom Shower/Tub:  (Cut out tub shower)  Bathroom Toilet: Standard  Bathroom Equipment: Shower chair, Grab bars in shower, Hand-held shower, Grab bars around toilet  Home Equipment:  (White cane; lent 4WW to a friend)  Has the patient had two or more falls in the past year or any fall with injury in the past year?:  (1 fall the day PTA looking for shoe on ground- staff assisted with helping to back up)  Prior Level of Assist for ADLs: Independent  Prior Level of Assist for Homemaking:  (kitchenette in apt- uses microwave, does not have stove; sstaff provide most meals)  Prior Level of Assist for Ambulation: Independent household ambulator, with or without device (White cane)  Prior Level of Assist for Transfers: Independent  Active : No  Additional Comments: Pt moved to Assisted Living March 1, 2025    Available Assistance at Discharge: 24 hr physical assistance available    Examination   Vision:  Vision: Impaired  Vision Exceptions: Legally blind (little to no vision)  Hearing:  Hearing: Within functional limits         Subjective  General: Pt is agreeable to PT. Has increased nausea once sitting EOB - vomiting for a few mins and then needs to use the commode.  Pain: Patient reports no pain  Pain Interventions: not applicable     Functional Mobility  Bed Mobility:  Supine to Sit: modified independent  Sit to Supine: not attempted  Comments: HOB elevated; use of rails; in recliner at end of session  Transfers:  Sit to stand transfer: stand by assistance  Stand to sit transfer: stand by assistance   Ambulation:  Surface:level surface  Assistive Device: white cane  Assistance: stand by

## (undated) DEVICE — ENDOSCOPY KIT: Brand: MEDLINE INDUSTRIES, INC.

## (undated) DEVICE — BITE BLOCK ENDOSCP AD 60 FR W/ ADJ STRP PLAS GRN BLOX

## (undated) DEVICE — FORMALIN CLEAR VIAL 20 ML 10%

## (undated) DEVICE — FORCEPS BX 240CM 2.4MM L NDL RAD JAW 4 M00513334